# Patient Record
Sex: MALE | Race: BLACK OR AFRICAN AMERICAN | Employment: OTHER | URBAN - METROPOLITAN AREA
[De-identification: names, ages, dates, MRNs, and addresses within clinical notes are randomized per-mention and may not be internally consistent; named-entity substitution may affect disease eponyms.]

---

## 2017-02-13 RX ORDER — IRON POLYSACCHARIDE COMPLEX 180 MG
CAPSULE ORAL
Qty: 90 CAPSULE | Refills: 2 | Status: SHIPPED | OUTPATIENT
Start: 2017-02-13 | End: 2018-02-20 | Stop reason: SDUPTHER

## 2017-02-28 ENCOUNTER — OFFICE VISIT (OUTPATIENT)
Dept: PRIMARY CARE CLINIC | Age: 82
End: 2017-02-28

## 2017-02-28 VITALS
DIASTOLIC BLOOD PRESSURE: 80 MMHG | TEMPERATURE: 97.3 F | OXYGEN SATURATION: 99 % | BODY MASS INDEX: 27.18 KG/M2 | WEIGHT: 206 LBS | SYSTOLIC BLOOD PRESSURE: 130 MMHG | HEART RATE: 91 BPM

## 2017-02-28 DIAGNOSIS — I10 ESSENTIAL HYPERTENSION: ICD-10-CM

## 2017-02-28 DIAGNOSIS — G89.29 CHRONIC PAIN OF LEFT KNEE: ICD-10-CM

## 2017-02-28 DIAGNOSIS — C61 CA OF PROSTATE (HCC): ICD-10-CM

## 2017-02-28 DIAGNOSIS — M54.2 NECK PAIN: Primary | ICD-10-CM

## 2017-02-28 DIAGNOSIS — K21.9 GASTROESOPHAGEAL REFLUX DISEASE WITHOUT ESOPHAGITIS: ICD-10-CM

## 2017-02-28 DIAGNOSIS — E11.8 TYPE 2 DIABETES MELLITUS WITH COMPLICATION, WITHOUT LONG-TERM CURRENT USE OF INSULIN (HCC): ICD-10-CM

## 2017-02-28 DIAGNOSIS — E78.2 MIXED HYPERLIPIDEMIA: ICD-10-CM

## 2017-02-28 DIAGNOSIS — Z23 NEED FOR VACCINATION FOR STREP PNEUMONIAE WITH PCV 7: ICD-10-CM

## 2017-02-28 DIAGNOSIS — M25.562 CHRONIC PAIN OF LEFT KNEE: ICD-10-CM

## 2017-02-28 LAB
A/G RATIO: 1.2 (ref 1.1–2.2)
ALBUMIN SERPL-MCNC: 4.5 G/DL (ref 3.4–5)
ALP BLD-CCNC: 57 U/L (ref 40–129)
ALT SERPL-CCNC: 19 U/L (ref 10–40)
ANION GAP SERPL CALCULATED.3IONS-SCNC: 18 MMOL/L (ref 3–16)
AST SERPL-CCNC: 22 U/L (ref 15–37)
BASOPHILS ABSOLUTE: 0 K/UL (ref 0–0.2)
BASOPHILS RELATIVE PERCENT: 0.9 %
BILIRUB SERPL-MCNC: 0.4 MG/DL (ref 0–1)
BILIRUBIN URINE: NEGATIVE
BLOOD, URINE: NEGATIVE
BUN BLDV-MCNC: 11 MG/DL (ref 7–20)
CALCIUM SERPL-MCNC: 9.9 MG/DL (ref 8.3–10.6)
CHLORIDE BLD-SCNC: 101 MMOL/L (ref 99–110)
CHOLESTEROL, TOTAL: 172 MG/DL (ref 0–199)
CLARITY: CLEAR
CO2: 23 MMOL/L (ref 21–32)
COLOR: ABNORMAL
CREAT SERPL-MCNC: 1 MG/DL (ref 0.8–1.3)
CRYSTALS, UA: ABNORMAL /HPF
EOSINOPHILS ABSOLUTE: 0.3 K/UL (ref 0–0.6)
EOSINOPHILS RELATIVE PERCENT: 7.5 %
EPITHELIAL CELLS, UA: ABNORMAL /HPF
GFR AFRICAN AMERICAN: >60
GFR NON-AFRICAN AMERICAN: >60
GLOBULIN: 3.8 G/DL
GLUCOSE BLD-MCNC: 116 MG/DL (ref 70–99)
GLUCOSE URINE: NEGATIVE MG/DL
HBA1C MFR BLD: 6 %
HCT VFR BLD CALC: 41.8 % (ref 40.5–52.5)
HDLC SERPL-MCNC: 47 MG/DL (ref 40–60)
HEMOGLOBIN: 13.9 G/DL (ref 13.5–17.5)
KETONES, URINE: ABNORMAL MG/DL
LDL CHOLESTEROL CALCULATED: 79 MG/DL
LEUKOCYTE ESTERASE, URINE: NEGATIVE
LYMPHOCYTES ABSOLUTE: 1.4 K/UL (ref 1–5.1)
LYMPHOCYTES RELATIVE PERCENT: 34.3 %
MCH RBC QN AUTO: 31.1 PG (ref 26–34)
MCHC RBC AUTO-ENTMCNC: 33.2 G/DL (ref 31–36)
MCV RBC AUTO: 93.6 FL (ref 80–100)
MICROSCOPIC EXAMINATION: YES
MONOCYTES ABSOLUTE: 0.3 K/UL (ref 0–1.3)
MONOCYTES RELATIVE PERCENT: 8.4 %
NEUTROPHILS ABSOLUTE: 2 K/UL (ref 1.7–7.7)
NEUTROPHILS RELATIVE PERCENT: 48.9 %
NITRITE, URINE: NEGATIVE
PDW BLD-RTO: 14.1 % (ref 12.4–15.4)
PH UA: 6
PLATELET # BLD: 250 K/UL (ref 135–450)
PMV BLD AUTO: 8.3 FL (ref 5–10.5)
POTASSIUM SERPL-SCNC: 3.9 MMOL/L (ref 3.5–5.1)
PROTEIN UA: ABNORMAL MG/DL
RBC # BLD: 4.47 M/UL (ref 4.2–5.9)
RBC UA: ABNORMAL /HPF (ref 0–2)
SODIUM BLD-SCNC: 142 MMOL/L (ref 136–145)
SPECIFIC GRAVITY UA: 1.02
TOTAL PROTEIN: 8.3 G/DL (ref 6.4–8.2)
TRIGL SERPL-MCNC: 231 MG/DL (ref 0–150)
TSH SERPL DL<=0.05 MIU/L-ACNC: 2.86 UIU/ML (ref 0.27–4.2)
URINE TYPE: ABNORMAL
UROBILINOGEN, URINE: 0.2 E.U./DL
VLDLC SERPL CALC-MCNC: 46 MG/DL
WBC # BLD: 4.1 K/UL (ref 4–11)
WBC UA: ABNORMAL /HPF (ref 0–5)

## 2017-02-28 PROCEDURE — G0009 ADMIN PNEUMOCOCCAL VACCINE: HCPCS | Performed by: INTERNAL MEDICINE

## 2017-02-28 PROCEDURE — 90670 PCV13 VACCINE IM: CPT | Performed by: INTERNAL MEDICINE

## 2017-02-28 PROCEDURE — 83036 HEMOGLOBIN GLYCOSYLATED A1C: CPT | Performed by: INTERNAL MEDICINE

## 2017-02-28 PROCEDURE — 99214 OFFICE O/P EST MOD 30 MIN: CPT | Performed by: INTERNAL MEDICINE

## 2017-02-28 RX ORDER — PRAVASTATIN SODIUM 20 MG
20 TABLET ORAL DAILY
Qty: 30 TABLET | Refills: 6 | Status: SHIPPED | OUTPATIENT
Start: 2017-02-28 | End: 2017-11-30 | Stop reason: SDUPTHER

## 2017-02-28 RX ORDER — ACETAMINOPHEN 500 MG
500 TABLET ORAL EVERY 6 HOURS PRN
Qty: 50 TABLET | Refills: 3 | Status: SHIPPED | OUTPATIENT
Start: 2017-02-28 | End: 2018-01-23 | Stop reason: SDUPTHER

## 2017-02-28 RX ORDER — OMEPRAZOLE 40 MG/1
40 CAPSULE, DELAYED RELEASE ORAL DAILY
Qty: 30 CAPSULE | Refills: 5 | Status: SHIPPED | OUTPATIENT
Start: 2017-02-28 | End: 2017-08-21 | Stop reason: SDUPTHER

## 2017-02-28 RX ORDER — ASPIRIN 81 MG/1
81 TABLET ORAL DAILY
Qty: 90 TABLET | Refills: 11 | Status: SHIPPED | OUTPATIENT
Start: 2017-02-28 | End: 2018-01-23 | Stop reason: SDUPTHER

## 2017-02-28 RX ORDER — NIFEDIPINE 60 MG/1
60 TABLET, EXTENDED RELEASE ORAL DAILY
Qty: 30 TABLET | Refills: 6 | Status: SHIPPED | OUTPATIENT
Start: 2017-02-28 | End: 2017-11-30 | Stop reason: SDUPTHER

## 2017-02-28 ASSESSMENT — ENCOUNTER SYMPTOMS
SHORTNESS OF BREATH: 0
BLURRED VISION: 0
COUGH: 0
CHEST TIGHTNESS: 0
GASTROINTESTINAL NEGATIVE: 1
WHEEZING: 0
EYES NEGATIVE: 1
CONSTIPATION: 0
BLOOD IN STOOL: 0
DIARRHEA: 0

## 2017-02-28 ASSESSMENT — PATIENT HEALTH QUESTIONNAIRE - PHQ9
2. FEELING DOWN, DEPRESSED OR HOPELESS: 0
1. LITTLE INTEREST OR PLEASURE IN DOING THINGS: 0
SUM OF ALL RESPONSES TO PHQ9 QUESTIONS 1 & 2: 0
SUM OF ALL RESPONSES TO PHQ QUESTIONS 1-9: 0

## 2017-03-02 LAB
PROSTATE SPECIFIC ANTIGEN FREE: <0.1 NG/ML
PROSTATE SPECIFIC ANTIGEN PERCENT FREE: <100 %
PROSTATE SPECIFIC ANTIGEN: 0.1 NG/ML (ref 0–4)

## 2017-03-17 ENCOUNTER — TELEPHONE (OUTPATIENT)
Dept: PRIMARY CARE CLINIC | Age: 82
End: 2017-03-17

## 2017-04-04 ENCOUNTER — OFFICE VISIT (OUTPATIENT)
Dept: PRIMARY CARE CLINIC | Age: 82
End: 2017-04-04

## 2017-04-04 VITALS
WEIGHT: 208 LBS | HEART RATE: 85 BPM | BODY MASS INDEX: 27.44 KG/M2 | SYSTOLIC BLOOD PRESSURE: 140 MMHG | DIASTOLIC BLOOD PRESSURE: 85 MMHG | OXYGEN SATURATION: 98 %

## 2017-04-04 DIAGNOSIS — I10 ESSENTIAL HYPERTENSION: ICD-10-CM

## 2017-04-04 DIAGNOSIS — M17.12 PRIMARY OSTEOARTHRITIS OF LEFT KNEE: Primary | ICD-10-CM

## 2017-04-04 PROCEDURE — 99213 OFFICE O/P EST LOW 20 MIN: CPT | Performed by: INTERNAL MEDICINE

## 2017-04-04 ASSESSMENT — ENCOUNTER SYMPTOMS
EYES NEGATIVE: 1
SHORTNESS OF BREATH: 0
WHEEZING: 0
DIARRHEA: 0
BLURRED VISION: 0
GASTROINTESTINAL NEGATIVE: 1
CHEST TIGHTNESS: 0
CONSTIPATION: 0
BLOOD IN STOOL: 0
COUGH: 0

## 2017-04-18 ENCOUNTER — TELEPHONE (OUTPATIENT)
Dept: PRIMARY CARE CLINIC | Age: 82
End: 2017-04-18

## 2017-04-27 ENCOUNTER — TELEPHONE (OUTPATIENT)
Dept: PRIMARY CARE CLINIC | Age: 82
End: 2017-04-27

## 2017-04-27 DIAGNOSIS — M25.562 LEFT KNEE PAIN, UNSPECIFIED CHRONICITY: Primary | ICD-10-CM

## 2017-05-03 ENCOUNTER — OFFICE VISIT (OUTPATIENT)
Dept: ORTHOPEDIC SURGERY | Age: 82
End: 2017-05-03

## 2017-05-03 VITALS
TEMPERATURE: 97.8 F | WEIGHT: 206 LBS | DIASTOLIC BLOOD PRESSURE: 74 MMHG | HEART RATE: 83 BPM | HEIGHT: 73 IN | BODY MASS INDEX: 27.3 KG/M2 | SYSTOLIC BLOOD PRESSURE: 119 MMHG

## 2017-05-03 DIAGNOSIS — M17.0 PRIMARY OSTEOARTHRITIS OF BOTH KNEES: Primary | ICD-10-CM

## 2017-05-03 PROCEDURE — 99203 OFFICE O/P NEW LOW 30 MIN: CPT | Performed by: ORTHOPAEDIC SURGERY

## 2017-05-03 PROCEDURE — L1812 KO ELASTIC W/JOINTS PRE OTS: HCPCS | Performed by: ORTHOPAEDIC SURGERY

## 2017-09-28 ENCOUNTER — OFFICE VISIT (OUTPATIENT)
Dept: PRIMARY CARE CLINIC | Age: 82
End: 2017-09-28

## 2017-09-28 VITALS
TEMPERATURE: 97.7 F | WEIGHT: 205 LBS | SYSTOLIC BLOOD PRESSURE: 140 MMHG | RESPIRATION RATE: 16 BRPM | OXYGEN SATURATION: 100 % | HEIGHT: 73 IN | HEART RATE: 80 BPM | DIASTOLIC BLOOD PRESSURE: 78 MMHG | BODY MASS INDEX: 27.17 KG/M2

## 2017-09-28 DIAGNOSIS — Z23 NEED FOR TDAP VACCINATION: ICD-10-CM

## 2017-09-28 DIAGNOSIS — Z23 FLU VACCINE NEED: ICD-10-CM

## 2017-09-28 DIAGNOSIS — I10 ESSENTIAL HYPERTENSION: ICD-10-CM

## 2017-09-28 DIAGNOSIS — L98.9 FACE LESION: ICD-10-CM

## 2017-09-28 DIAGNOSIS — E11.8 TYPE 2 DIABETES MELLITUS WITH COMPLICATION, WITHOUT LONG-TERM CURRENT USE OF INSULIN (HCC): ICD-10-CM

## 2017-09-28 DIAGNOSIS — J40 BRONCHITIS: Primary | ICD-10-CM

## 2017-09-28 LAB — HBA1C MFR BLD: 6 %

## 2017-09-28 PROCEDURE — 83036 HEMOGLOBIN GLYCOSYLATED A1C: CPT | Performed by: INTERNAL MEDICINE

## 2017-09-28 PROCEDURE — 90662 IIV NO PRSV INCREASED AG IM: CPT | Performed by: INTERNAL MEDICINE

## 2017-09-28 PROCEDURE — G0008 ADMIN INFLUENZA VIRUS VAC: HCPCS | Performed by: INTERNAL MEDICINE

## 2017-09-28 PROCEDURE — 99214 OFFICE O/P EST MOD 30 MIN: CPT | Performed by: INTERNAL MEDICINE

## 2017-09-28 PROCEDURE — 90471 IMMUNIZATION ADMIN: CPT | Performed by: INTERNAL MEDICINE

## 2017-09-28 PROCEDURE — 90715 TDAP VACCINE 7 YRS/> IM: CPT | Performed by: INTERNAL MEDICINE

## 2017-09-28 RX ORDER — GUAIFENESIN 600 MG/1
600 TABLET, EXTENDED RELEASE ORAL 2 TIMES DAILY
Qty: 12 TABLET | Refills: 0 | Status: SHIPPED | OUTPATIENT
Start: 2017-09-28 | End: 2018-01-23

## 2017-09-28 RX ORDER — AZITHROMYCIN 250 MG/1
TABLET, FILM COATED ORAL
Qty: 1 PACKET | Refills: 0 | Status: SHIPPED | OUTPATIENT
Start: 2017-09-28 | End: 2017-10-08

## 2017-09-28 ASSESSMENT — ENCOUNTER SYMPTOMS
COUGH: 1
WHEEZING: 0
COLOR CHANGE: 1
SHORTNESS OF BREATH: 0
EYES NEGATIVE: 1
CHEST TIGHTNESS: 0
GASTROINTESTINAL NEGATIVE: 1
CONSTIPATION: 0
DIARRHEA: 0
BLOOD IN STOOL: 0

## 2017-11-13 ENCOUNTER — OFFICE VISIT (OUTPATIENT)
Dept: PRIMARY CARE CLINIC | Age: 82
End: 2017-11-13

## 2017-11-13 ENCOUNTER — OFFICE VISIT (OUTPATIENT)
Dept: SURGERY | Age: 82
End: 2017-11-13

## 2017-11-13 VITALS
HEIGHT: 73 IN | OXYGEN SATURATION: 98 % | DIASTOLIC BLOOD PRESSURE: 80 MMHG | HEART RATE: 84 BPM | SYSTOLIC BLOOD PRESSURE: 140 MMHG | WEIGHT: 201 LBS | TEMPERATURE: 98.9 F | BODY MASS INDEX: 26.64 KG/M2

## 2017-11-13 VITALS
WEIGHT: 206 LBS | BODY MASS INDEX: 27.3 KG/M2 | HEIGHT: 73 IN | DIASTOLIC BLOOD PRESSURE: 73 MMHG | HEART RATE: 87 BPM | SYSTOLIC BLOOD PRESSURE: 142 MMHG

## 2017-11-13 DIAGNOSIS — L85.8 CUTANEOUS HORN: ICD-10-CM

## 2017-11-13 DIAGNOSIS — L98.9 SKIN LESION OF FACE: Primary | ICD-10-CM

## 2017-11-13 DIAGNOSIS — J20.9 BRONCHITIS, ACUTE, WITH BRONCHOSPASM: Primary | ICD-10-CM

## 2017-11-13 PROCEDURE — 99213 OFFICE O/P EST LOW 20 MIN: CPT | Performed by: INTERNAL MEDICINE

## 2017-11-13 PROCEDURE — G8484 FLU IMMUNIZE NO ADMIN: HCPCS | Performed by: INTERNAL MEDICINE

## 2017-11-13 PROCEDURE — G8427 DOCREV CUR MEDS BY ELIG CLIN: HCPCS | Performed by: INTERNAL MEDICINE

## 2017-11-13 PROCEDURE — 4040F PNEUMOC VAC/ADMIN/RCVD: CPT | Performed by: INTERNAL MEDICINE

## 2017-11-13 PROCEDURE — G8419 CALC BMI OUT NRM PARAM NOF/U: HCPCS | Performed by: INTERNAL MEDICINE

## 2017-11-13 PROCEDURE — 1036F TOBACCO NON-USER: CPT | Performed by: INTERNAL MEDICINE

## 2017-11-13 PROCEDURE — 1123F ACP DISCUSS/DSCN MKR DOCD: CPT | Performed by: INTERNAL MEDICINE

## 2017-11-13 PROCEDURE — 11311 SHAVE SKIN LESION 0.6-1.0 CM: CPT | Performed by: SURGERY

## 2017-11-13 RX ORDER — AZITHROMYCIN 250 MG/1
250 TABLET, FILM COATED ORAL DAILY
Qty: 14 TABLET | Refills: 0 | Status: SHIPPED | OUTPATIENT
Start: 2017-11-13 | End: 2018-01-23

## 2017-11-13 RX ORDER — BUDESONIDE AND FORMOTEROL FUMARATE DIHYDRATE 160; 4.5 UG/1; UG/1
1 AEROSOL RESPIRATORY (INHALATION) 2 TIMES DAILY
Qty: 1 INHALER | Refills: 3 | Status: SHIPPED | OUTPATIENT
Start: 2017-11-13 | End: 2018-01-23

## 2017-11-13 RX ORDER — MOMETASONE FUROATE 50 UG/1
2 SPRAY, METERED NASAL DAILY
Qty: 1 INHALER | Refills: 0 | Status: SHIPPED | OUTPATIENT
Start: 2017-11-13 | End: 2018-01-23

## 2017-11-13 ASSESSMENT — ENCOUNTER SYMPTOMS
DIARRHEA: 0
RHINORRHEA: 1
EYES NEGATIVE: 1
WHEEZING: 0
CHEST TIGHTNESS: 0
COLOR CHANGE: 1
GASTROINTESTINAL NEGATIVE: 1
BLOOD IN STOOL: 0
COUGH: 1
SHORTNESS OF BREATH: 0
CONSTIPATION: 0

## 2017-11-13 NOTE — PATIENT INSTRUCTIONS
Face lesion removed. Will send specimen off to pathology. We will call with results. Apply Neosporin to wound twice daily. Follow up as needed.

## 2017-11-13 NOTE — PROGRESS NOTES
New Patient [facility]   Estella Schultz MD    [address], [address]  [place]  Phone: [phone]  Fax: [phone]   YOB: 1932    Date of Visit:  11/13/2017    No ref. provider found  Prema Underwood MD    HPI:   Sebaceous cyst: Denia Rod presents for evaluation of a skin lesion as a referral from Prema Underwood MD.  Lesion is located in the left preauricular area. Onset was 4 months ago. He denies any pain, but the lesion is growing longer and bothers him with wearing glasses. Patient does not have any family or personal history of skin cancer. Patient does have diabetes. He denies any fever and chills. Pain Score:   0 - No pain    No Known Allergies  Outpatient Prescriptions Marked as Taking for the 11/13/17 encounter (Office Visit) with Patrick Robles MD   Medication Sig Dispense Refill    guaiFENesin (MUCINEX) 600 MG extended release tablet Take 1 tablet by mouth 2 times daily 12 tablet 0    HYDROcodone-homatropine (HYCODAN) 5-1.5 MG/5ML syrup Take 2.5 mLs by mouth every 6 hours as needed (cough) .  4 mL 0    omeprazole (PRILOSEC) 40 MG delayed release capsule TAKE ONE CAPSULE BY MOUTH DAILY 90 capsule 0    metFORMIN (GLUCOPHAGE) 500 MG tablet Take 1 tablet by mouth daily (with breakfast) 30 tablet 6    pravastatin (PRAVACHOL) 20 MG tablet Take 1 tablet by mouth daily 30 tablet 6    aspirin 81 MG EC tablet Take 1 tablet by mouth daily 90 tablet 11    NIFEdipine (NIFEDICAL XL) 60 MG extended release tablet Take 1 tablet by mouth daily 30 tablet 6    acetaminophen (APAP EXTRA STRENGTH) 500 MG tablet Take 1 tablet by mouth every 6 hours as needed for Pain 50 tablet 3    PROFE 391.3 (180 FE) MG CAPS TAKE ONE CAPSULE BY MOUTH DAILY 90 capsule 2       Past Medical History:   Diagnosis Date    Hyperlipidemia     Hypertension     Type II or unspecified type diabetes mellitus without mention of complication, not stated as uncontrolled      Past Surgical History:   Procedure Laterality Date    COLONOSCOPY       History reviewed. No pertinent family history. Social History     Social History    Marital status:      Spouse name: N/A    Number of children: N/A    Years of education: N/A     Occupational History    Not on file. Social History Main Topics    Smoking status: Never Smoker    Smokeless tobacco: Never Used    Alcohol use Yes      Comment: occas    Drug use: No    Sexual activity: Not on file     Other Topics Concern    Not on file     Social History Narrative    No narrative on file          Vitals:    11/13/17 0915   BP: (!) 142/73   Pulse: 87   Weight: 206 lb (93.4 kg)   Height: 6' 1\" (1.854 m)     Body mass index is 27.18 kg/m². Wt Readings from Last 3 Encounters:   11/13/17 201 lb (91.2 kg)   11/13/17 206 lb (93.4 kg)   09/28/17 205 lb (93 kg)     BP Readings from Last 3 Encounters:   11/13/17 (!) 140/80   11/13/17 (!) 142/73   09/28/17 (!) 140/78          REVIEW OF SYSTEMS:   Pertinent positives are in HPI, otherwise all systems reviewed and negative    PHYSICAL EXAM:    CONSTITUTIONAL:  awake, alert, no apparent distress and normal weight  ENT:  normocephalic, without obvious abnormality  NECK:  supple, symmetrical, trachea midline   LUNGS:  Resp easy and unlabored  CARDIOVASCULAR:  regular rate and rhythm  MUSCULOSKELETAL: No edema  NEUROLOGIC:  Mental Status Exam:  Level of Alertness:   awake  Orientation:   person, place, time  SKIN: left temple with 6 mm diameter 1 cm long growth extending from skin with pale irregular surface, no surrounding erythema or drainage, no cervical, pre or post auricular lymphadenopathy        ASSESSMENT:    1. Skin lesion of face  SURGICAL PATHOLOGY    19059 - OR SHAV SKIN LES 6-10MM FACE,FACIAL   2.  Cutaneous horn  SURGICAL PATHOLOGY    59174 - OR SHAV SKIN LES 6-10MM FACE,FACIAL PLAN:    After evaluating the patient, I believe that the patient has a left pre-auricular cutaneous horn. Although he is [place], I am concerned that it may be associated with a squamous cell carcinoma. I performed a shave excision of the lesion. After verbal consent was obtained, the left temple was prepped and draped. 2% lidocaine with epinephrine was injected around the 6 mm round lesion. A 15 blade scalpel was used to shave the lesion off at its base. Pressure and silver nitrate sticks were used for hemostasis. The lesion was able to be removed in its entirety. Polysporin ointment was placed over the wound. The pt tolerated the procedure well. Will call patient with the pathology results, and follow up as indicated.     Electronically signed by Navi Goodwin MD

## 2017-11-29 ENCOUNTER — TELEPHONE (OUTPATIENT)
Dept: PRIMARY CARE CLINIC | Age: 82
End: 2017-11-29

## 2017-11-30 DIAGNOSIS — E11.8 TYPE 2 DIABETES MELLITUS WITH COMPLICATION, WITHOUT LONG-TERM CURRENT USE OF INSULIN (HCC): ICD-10-CM

## 2017-11-30 DIAGNOSIS — I10 ESSENTIAL HYPERTENSION: ICD-10-CM

## 2017-11-30 DIAGNOSIS — E78.2 MIXED HYPERLIPIDEMIA: ICD-10-CM

## 2017-11-30 DIAGNOSIS — K21.9 GASTROESOPHAGEAL REFLUX DISEASE WITHOUT ESOPHAGITIS: ICD-10-CM

## 2017-11-30 RX ORDER — OMEPRAZOLE 40 MG/1
40 CAPSULE, DELAYED RELEASE ORAL DAILY PRN
Qty: 30 CAPSULE | Refills: 5 | Status: SHIPPED | OUTPATIENT
Start: 2017-11-30 | End: 2018-05-22 | Stop reason: SDUPTHER

## 2017-11-30 RX ORDER — NIFEDIPINE 60 MG/1
60 TABLET, EXTENDED RELEASE ORAL DAILY
Qty: 30 TABLET | Refills: 6 | Status: SHIPPED | OUTPATIENT
Start: 2017-11-30 | End: 2018-01-23 | Stop reason: SDUPTHER

## 2017-11-30 RX ORDER — PRAVASTATIN SODIUM 20 MG
20 TABLET ORAL DAILY
Qty: 30 TABLET | Refills: 6 | Status: SHIPPED | OUTPATIENT
Start: 2017-11-30 | End: 2018-01-23 | Stop reason: SDUPTHER

## 2018-01-23 ENCOUNTER — OFFICE VISIT (OUTPATIENT)
Dept: PRIMARY CARE CLINIC | Age: 83
End: 2018-01-23

## 2018-01-23 VITALS
TEMPERATURE: 97.3 F | WEIGHT: 202 LBS | DIASTOLIC BLOOD PRESSURE: 70 MMHG | HEART RATE: 77 BPM | SYSTOLIC BLOOD PRESSURE: 120 MMHG | HEIGHT: 73 IN | OXYGEN SATURATION: 97 % | BODY MASS INDEX: 26.77 KG/M2

## 2018-01-23 DIAGNOSIS — E78.2 MIXED HYPERLIPIDEMIA: ICD-10-CM

## 2018-01-23 DIAGNOSIS — M15.9 GENERALIZED OA: ICD-10-CM

## 2018-01-23 DIAGNOSIS — E11.9 TYPE 2 DIABETES MELLITUS WITHOUT COMPLICATION, WITHOUT LONG-TERM CURRENT USE OF INSULIN (HCC): Primary | ICD-10-CM

## 2018-01-23 DIAGNOSIS — H61.23 BILATERAL IMPACTED CERUMEN: ICD-10-CM

## 2018-01-23 DIAGNOSIS — I10 ESSENTIAL HYPERTENSION: ICD-10-CM

## 2018-01-23 LAB — HBA1C MFR BLD: 5.7 %

## 2018-01-23 PROCEDURE — 99214 OFFICE O/P EST MOD 30 MIN: CPT | Performed by: INTERNAL MEDICINE

## 2018-01-23 PROCEDURE — G8427 DOCREV CUR MEDS BY ELIG CLIN: HCPCS | Performed by: INTERNAL MEDICINE

## 2018-01-23 PROCEDURE — 83036 HEMOGLOBIN GLYCOSYLATED A1C: CPT | Performed by: INTERNAL MEDICINE

## 2018-01-23 PROCEDURE — 1036F TOBACCO NON-USER: CPT | Performed by: INTERNAL MEDICINE

## 2018-01-23 PROCEDURE — 4040F PNEUMOC VAC/ADMIN/RCVD: CPT | Performed by: INTERNAL MEDICINE

## 2018-01-23 PROCEDURE — G8419 CALC BMI OUT NRM PARAM NOF/U: HCPCS | Performed by: INTERNAL MEDICINE

## 2018-01-23 PROCEDURE — 1123F ACP DISCUSS/DSCN MKR DOCD: CPT | Performed by: INTERNAL MEDICINE

## 2018-01-23 PROCEDURE — G8484 FLU IMMUNIZE NO ADMIN: HCPCS | Performed by: INTERNAL MEDICINE

## 2018-01-23 RX ORDER — PRAVASTATIN SODIUM 20 MG
20 TABLET ORAL DAILY
Qty: 30 TABLET | Refills: 6 | Status: SHIPPED | OUTPATIENT
Start: 2018-01-23 | End: 2018-05-22 | Stop reason: SDUPTHER

## 2018-01-23 RX ORDER — ASPIRIN 81 MG/1
81 TABLET ORAL DAILY
Qty: 90 TABLET | Refills: 11 | Status: SHIPPED | OUTPATIENT
Start: 2018-01-23 | End: 2018-05-22 | Stop reason: SDUPTHER

## 2018-01-23 RX ORDER — ACETAMINOPHEN 500 MG
500 TABLET ORAL EVERY 6 HOURS PRN
Qty: 50 TABLET | Refills: 3 | Status: SHIPPED | OUTPATIENT
Start: 2018-01-23 | End: 2019-06-04

## 2018-01-23 RX ORDER — NIFEDIPINE 60 MG/1
60 TABLET, EXTENDED RELEASE ORAL DAILY
Qty: 30 TABLET | Refills: 6 | Status: SHIPPED | OUTPATIENT
Start: 2018-01-23 | End: 2018-05-22 | Stop reason: SDUPTHER

## 2018-01-23 ASSESSMENT — ENCOUNTER SYMPTOMS
COLOR CHANGE: 1
GASTROINTESTINAL NEGATIVE: 1
WHEEZING: 0
SHORTNESS OF BREATH: 0
CHEST TIGHTNESS: 0
CONSTIPATION: 0
EYES NEGATIVE: 1
BLOOD IN STOOL: 0
DIARRHEA: 0

## 2018-01-23 NOTE — PROGRESS NOTES
Subjective:      Patient ID: Ashley Michelle is a 80 y.o. male. 1/23/18  Patient presents with:  Diabetes  Hypertension  Hearing Problem    Symbicort helped tremendously! 11/13/17              Diabetes   Pertinent negatives for hypoglycemia include no dizziness, headaches, nervousness/anxiousness or tremors. Pertinent negatives for diabetes include no chest pain, no fatigue and no weakness. Review of Systems   Constitutional: Negative for chills, fatigue and fever. Pneumovac 2007. PREVNAR 2/17    Tdap 9/17    Flu vac  9/17    Zostavac / script    HENT: Positive for hearing loss. Dentures . Eyes: Negative. Eye Ex  9/16    Cataract surgery planned 4/17   Respiratory: Negative for chest tightness, shortness of breath and wheezing. Does not smoke    Cardiovascular: Negative. Negative for chest pain. HTN. No CAD   Gastrointestinal: Negative. Negative for blood in stool, constipation and diarrhea. Repeat Colonoscopy 10/15 . Dr Chetan Ceron . Polyps   Endocrine:        Diabetes > 10 yrs    Simple cysts in Thyroid US / 3/15   Genitourinary: Negative for dysuria and urgency. H/O Prostate Cancer ( 2004)  Saw Dr Fouzia Rothman  . 2/16    Musculoskeletal: Positive for arthralgias. Sees Podiatrist regularly  q 6 mths    Skin: Positive for color change. Negative for rash. Allergic/Immunologic: Negative for food allergies. Neurological: Negative for dizziness, tremors, weakness, light-headedness and headaches. Psychiatric/Behavioral: Negative for behavioral problems and sleep disturbance. The patient is not nervous/anxious. Objective:   Physical Exam   Constitutional: He is oriented to person, place, and time. No distress. HENT:   Ear wax bilat   Eyes: Conjunctivae are normal.   Cardiovascular: Normal rate, regular rhythm and normal heart sounds. Pulmonary/Chest: Breath sounds normal. He has no wheezes. He has no rales.        Abdominal: He exhibits distension. There is no tenderness. Musculoskeletal: Normal range of motion. Neurological: He is alert and oriented to person, place, and time. He has normal strength. Skin: Skin is warm. Psychiatric: He has a normal mood and affect. His behavior is normal.   Vitals reviewed. Assessment:      Mari Yepez was seen today for diabetes, hypertension and hearing problem. Diagnoses and all orders for this visit:    Type 2 diabetes mellitus without complication, without long-term current use of insulin ([facility]) A1C 5.7  Cont same   -     POCT glycosylated hemoglobin (Hb A1C)  -     metFORMIN (GLUCOPHAGE) 500 MG tablet; Take 1 tablet by mouth daily (with breakfast)  -     pravastatin (PRAVACHOL) 20 MG tablet; Take 1 tablet by mouth daily  -     aspirin 81 MG EC tablet; Take 1 tablet by mouth daily    Essential hypertension  Controlled   -     NIFEdipine (NIFEDICAL XL) 60 MG extended release tablet; Take 1 tablet by mouth daily    Mixed hyperlipidemia  Controlled   -     pravastatin (PRAVACHOL) 20 MG tablet; Take 1 tablet by mouth daily        Generalized OA  -     acetaminophen (APAP EXTRA STRENGTH) 500 MG tablet;  Take 1 tablet by mouth every 6 hours as needed for Pain        Bilateral impacted cerumen  -     OR REMOVAL IMPACTED CERUMEN IRRIGATION/LVG UNILAT                CA of prostate ([address]                  -     -    Self Management Goals    Know which medication is for what condition:   Know side effects of medications, and discuss with doctor   Discuss side effects and instructions on new medications  Know correct dose/frequency of medications  Take medications at the same time each day  Stay current on medication refills  If taking OTC's check with MD/pharmacy first about interactions  Monitor sugars and record time/meal  A1C goal 7.0 or less  LDL goal 805 or less  Systolic BP < or equal to 834  Diastolic BP < or equal to 85    Current Flu and Pneumonia Vax  Set targets for weight loss 4 lbs per

## 2018-03-12 RX ORDER — IRON POLYSACCHARIDE COMPLEX 180 MG
CAPSULE ORAL
Qty: 30 CAPSULE | Refills: 0 | Status: SHIPPED | OUTPATIENT
Start: 2018-03-12 | End: 2018-04-10 | Stop reason: SDUPTHER

## 2018-04-11 RX ORDER — IRON POLYSACCHARIDE COMPLEX 180 MG
CAPSULE ORAL
Qty: 30 CAPSULE | Refills: 0 | Status: SHIPPED | OUTPATIENT
Start: 2018-04-11 | End: 2018-05-19 | Stop reason: SDUPTHER

## 2018-05-21 RX ORDER — IRON POLYSACCHARIDE COMPLEX 180 MG
CAPSULE ORAL
Qty: 30 CAPSULE | Refills: 0 | Status: SHIPPED | OUTPATIENT
Start: 2018-05-21 | End: 2018-06-20 | Stop reason: SDUPTHER

## 2018-05-22 ENCOUNTER — OFFICE VISIT (OUTPATIENT)
Dept: PRIMARY CARE CLINIC | Age: 83
End: 2018-05-22

## 2018-05-22 VITALS
OXYGEN SATURATION: 99 % | BODY MASS INDEX: 25.98 KG/M2 | SYSTOLIC BLOOD PRESSURE: 130 MMHG | TEMPERATURE: 98.8 F | HEIGHT: 73 IN | DIASTOLIC BLOOD PRESSURE: 70 MMHG | WEIGHT: 196 LBS | HEART RATE: 86 BPM

## 2018-05-22 DIAGNOSIS — E78.2 MIXED HYPERLIPIDEMIA: ICD-10-CM

## 2018-05-22 DIAGNOSIS — E11.9 TYPE 2 DIABETES MELLITUS WITHOUT COMPLICATION, WITHOUT LONG-TERM CURRENT USE OF INSULIN (HCC): ICD-10-CM

## 2018-05-22 DIAGNOSIS — E11.9 TYPE 2 DIABETES MELLITUS WITHOUT COMPLICATION, WITHOUT LONG-TERM CURRENT USE OF INSULIN (HCC): Primary | ICD-10-CM

## 2018-05-22 DIAGNOSIS — Z23 NEED FOR SHINGLES VACCINE: ICD-10-CM

## 2018-05-22 DIAGNOSIS — K21.9 GASTROESOPHAGEAL REFLUX DISEASE WITHOUT ESOPHAGITIS: ICD-10-CM

## 2018-05-22 DIAGNOSIS — I10 ESSENTIAL HYPERTENSION: ICD-10-CM

## 2018-05-22 LAB
A/G RATIO: 1.4 (ref 1.1–2.2)
ALBUMIN SERPL-MCNC: 4.5 G/DL (ref 3.4–5)
ALP BLD-CCNC: 54 U/L (ref 40–129)
ALT SERPL-CCNC: 15 U/L (ref 10–40)
ANION GAP SERPL CALCULATED.3IONS-SCNC: 19 MMOL/L (ref 3–16)
AST SERPL-CCNC: 19 U/L (ref 15–37)
BASOPHILS ABSOLUTE: 0 K/UL (ref 0–0.2)
BASOPHILS RELATIVE PERCENT: 0.9 %
BILIRUB SERPL-MCNC: 0.4 MG/DL (ref 0–1)
BUN BLDV-MCNC: 11 MG/DL (ref 7–20)
CALCIUM SERPL-MCNC: 9.7 MG/DL (ref 8.3–10.6)
CHLORIDE BLD-SCNC: 102 MMOL/L (ref 99–110)
CHOLESTEROL, TOTAL: 146 MG/DL (ref 0–199)
CO2: 22 MMOL/L (ref 21–32)
CREAT SERPL-MCNC: 1 MG/DL (ref 0.8–1.3)
EOSINOPHILS ABSOLUTE: 0.5 K/UL (ref 0–0.6)
EOSINOPHILS RELATIVE PERCENT: 9.9 %
GFR AFRICAN AMERICAN: >60
GFR NON-AFRICAN AMERICAN: >60
GLOBULIN: 3.2 G/DL
GLUCOSE BLD-MCNC: 111 MG/DL (ref 70–99)
HBA1C MFR BLD: 5.7 %
HCT VFR BLD CALC: 36.7 % (ref 40.5–52.5)
HDLC SERPL-MCNC: 40 MG/DL (ref 40–60)
HEMOGLOBIN: 13 G/DL (ref 13.5–17.5)
LDL CHOLESTEROL CALCULATED: 60 MG/DL
LYMPHOCYTES ABSOLUTE: 1.5 K/UL (ref 1–5.1)
LYMPHOCYTES RELATIVE PERCENT: 32.1 %
MCH RBC QN AUTO: 32.7 PG (ref 26–34)
MCHC RBC AUTO-ENTMCNC: 35.4 G/DL (ref 31–36)
MCV RBC AUTO: 92.4 FL (ref 80–100)
MONOCYTES ABSOLUTE: 0.4 K/UL (ref 0–1.3)
MONOCYTES RELATIVE PERCENT: 9.1 %
NEUTROPHILS ABSOLUTE: 2.2 K/UL (ref 1.7–7.7)
NEUTROPHILS RELATIVE PERCENT: 48 %
PDW BLD-RTO: 13.8 % (ref 12.4–15.4)
PLATELET # BLD: 275 K/UL (ref 135–450)
PMV BLD AUTO: 8.4 FL (ref 5–10.5)
POTASSIUM SERPL-SCNC: 4 MMOL/L (ref 3.5–5.1)
RBC # BLD: 3.97 M/UL (ref 4.2–5.9)
SODIUM BLD-SCNC: 143 MMOL/L (ref 136–145)
TOTAL PROTEIN: 7.7 G/DL (ref 6.4–8.2)
TRIGL SERPL-MCNC: 228 MG/DL (ref 0–150)
TSH SERPL DL<=0.05 MIU/L-ACNC: 1.98 UIU/ML (ref 0.27–4.2)
VLDLC SERPL CALC-MCNC: 46 MG/DL
WBC # BLD: 4.6 K/UL (ref 4–11)

## 2018-05-22 PROCEDURE — 4040F PNEUMOC VAC/ADMIN/RCVD: CPT | Performed by: INTERNAL MEDICINE

## 2018-05-22 PROCEDURE — 99214 OFFICE O/P EST MOD 30 MIN: CPT | Performed by: INTERNAL MEDICINE

## 2018-05-22 PROCEDURE — 1036F TOBACCO NON-USER: CPT | Performed by: INTERNAL MEDICINE

## 2018-05-22 PROCEDURE — G8419 CALC BMI OUT NRM PARAM NOF/U: HCPCS | Performed by: INTERNAL MEDICINE

## 2018-05-22 PROCEDURE — 1123F ACP DISCUSS/DSCN MKR DOCD: CPT | Performed by: INTERNAL MEDICINE

## 2018-05-22 PROCEDURE — G8427 DOCREV CUR MEDS BY ELIG CLIN: HCPCS | Performed by: INTERNAL MEDICINE

## 2018-05-22 PROCEDURE — 83036 HEMOGLOBIN GLYCOSYLATED A1C: CPT | Performed by: INTERNAL MEDICINE

## 2018-05-22 RX ORDER — NIFEDIPINE 60 MG/1
60 TABLET, EXTENDED RELEASE ORAL DAILY
Qty: 30 TABLET | Refills: 6 | Status: SHIPPED | OUTPATIENT
Start: 2018-05-22 | End: 2018-09-04 | Stop reason: SDUPTHER

## 2018-05-22 RX ORDER — OMEPRAZOLE 40 MG/1
40 CAPSULE, DELAYED RELEASE ORAL DAILY PRN
Qty: 30 CAPSULE | Refills: 5 | Status: SHIPPED | OUTPATIENT
Start: 2018-05-22 | End: 2018-09-04 | Stop reason: SDUPTHER

## 2018-05-22 RX ORDER — PRAVASTATIN SODIUM 20 MG
20 TABLET ORAL DAILY
Qty: 30 TABLET | Refills: 6 | Status: SHIPPED | OUTPATIENT
Start: 2018-05-22 | End: 2018-09-04 | Stop reason: SDUPTHER

## 2018-05-22 RX ORDER — ASPIRIN 81 MG/1
81 TABLET ORAL DAILY
Qty: 90 TABLET | Refills: 11 | Status: SHIPPED | OUTPATIENT
Start: 2018-05-22 | End: 2018-09-04 | Stop reason: SDUPTHER

## 2018-05-22 ASSESSMENT — ENCOUNTER SYMPTOMS
CHEST TIGHTNESS: 0
CONSTIPATION: 0
BLOOD IN STOOL: 0
GASTROINTESTINAL NEGATIVE: 1
DIARRHEA: 0
EYES NEGATIVE: 1
SHORTNESS OF BREATH: 0
WHEEZING: 0

## 2018-05-22 ASSESSMENT — PATIENT HEALTH QUESTIONNAIRE - PHQ9
2. FEELING DOWN, DEPRESSED OR HOPELESS: 0
SUM OF ALL RESPONSES TO PHQ QUESTIONS 1-9: 0
SUM OF ALL RESPONSES TO PHQ9 QUESTIONS 1 & 2: 0
1. LITTLE INTEREST OR PLEASURE IN DOING THINGS: 0

## 2018-05-24 DIAGNOSIS — E11.9 TYPE 2 DIABETES MELLITUS WITHOUT COMPLICATION, WITHOUT LONG-TERM CURRENT USE OF INSULIN (HCC): ICD-10-CM

## 2018-05-24 LAB
BILIRUBIN URINE: NEGATIVE
BLOOD, URINE: NEGATIVE
CLARITY: CLEAR
COLOR: YELLOW
GLUCOSE URINE: NEGATIVE MG/DL
KETONES, URINE: NEGATIVE MG/DL
LEUKOCYTE ESTERASE, URINE: NEGATIVE
MICROSCOPIC EXAMINATION: NORMAL
NITRITE, URINE: NEGATIVE
PH UA: 7
PROTEIN UA: NEGATIVE MG/DL
SPECIFIC GRAVITY UA: 1.02
URINE TYPE: NORMAL
UROBILINOGEN, URINE: 0.2 E.U./DL

## 2018-06-21 RX ORDER — IRON POLYSACCHARIDE COMPLEX 180 MG
CAPSULE ORAL
Qty: 30 CAPSULE | Refills: 0 | Status: SHIPPED | OUTPATIENT
Start: 2018-06-21 | End: 2018-07-03 | Stop reason: SDUPTHER

## 2018-07-03 RX ORDER — IRON POLYSACCHARIDE COMPLEX 180 MG
CAPSULE ORAL
Qty: 30 CAPSULE | Refills: 0 | Status: SHIPPED | OUTPATIENT
Start: 2018-07-03 | End: 2018-08-21 | Stop reason: SDUPTHER

## 2018-08-22 RX ORDER — IRON POLYSACCHARIDE COMPLEX 180 MG
CAPSULE ORAL
Qty: 30 CAPSULE | Refills: 0 | Status: SHIPPED | OUTPATIENT
Start: 2018-08-22 | End: 2018-09-04 | Stop reason: SDUPTHER

## 2018-09-04 ENCOUNTER — OFFICE VISIT (OUTPATIENT)
Dept: PRIMARY CARE CLINIC | Age: 83
End: 2018-09-04

## 2018-09-04 VITALS
SYSTOLIC BLOOD PRESSURE: 130 MMHG | TEMPERATURE: 97.3 F | BODY MASS INDEX: 25.58 KG/M2 | HEART RATE: 85 BPM | WEIGHT: 193 LBS | OXYGEN SATURATION: 97 % | HEIGHT: 73 IN | DIASTOLIC BLOOD PRESSURE: 60 MMHG

## 2018-09-04 DIAGNOSIS — Z23 NEED FOR SHINGLES VACCINE: ICD-10-CM

## 2018-09-04 DIAGNOSIS — E78.2 MIXED HYPERLIPIDEMIA: ICD-10-CM

## 2018-09-04 DIAGNOSIS — K21.9 GASTROESOPHAGEAL REFLUX DISEASE WITHOUT ESOPHAGITIS: ICD-10-CM

## 2018-09-04 DIAGNOSIS — J30.1 SEASONAL ALLERGIC RHINITIS DUE TO POLLEN: ICD-10-CM

## 2018-09-04 DIAGNOSIS — D50.8 OTHER IRON DEFICIENCY ANEMIA: ICD-10-CM

## 2018-09-04 DIAGNOSIS — I10 ESSENTIAL HYPERTENSION: ICD-10-CM

## 2018-09-04 DIAGNOSIS — Z23 FLU VACCINE NEED: ICD-10-CM

## 2018-09-04 DIAGNOSIS — B68.9: ICD-10-CM

## 2018-09-04 DIAGNOSIS — E11.9 TYPE 2 DIABETES MELLITUS WITHOUT COMPLICATION, WITHOUT LONG-TERM CURRENT USE OF INSULIN (HCC): ICD-10-CM

## 2018-09-04 PROCEDURE — 1123F ACP DISCUSS/DSCN MKR DOCD: CPT | Performed by: INTERNAL MEDICINE

## 2018-09-04 PROCEDURE — 99214 OFFICE O/P EST MOD 30 MIN: CPT | Performed by: INTERNAL MEDICINE

## 2018-09-04 PROCEDURE — 4040F PNEUMOC VAC/ADMIN/RCVD: CPT | Performed by: INTERNAL MEDICINE

## 2018-09-04 PROCEDURE — G8427 DOCREV CUR MEDS BY ELIG CLIN: HCPCS | Performed by: INTERNAL MEDICINE

## 2018-09-04 PROCEDURE — G8419 CALC BMI OUT NRM PARAM NOF/U: HCPCS | Performed by: INTERNAL MEDICINE

## 2018-09-04 PROCEDURE — 1101F PT FALLS ASSESS-DOCD LE1/YR: CPT | Performed by: INTERNAL MEDICINE

## 2018-09-04 PROCEDURE — 1036F TOBACCO NON-USER: CPT | Performed by: INTERNAL MEDICINE

## 2018-09-04 RX ORDER — CLOTRIMAZOLE AND BETAMETHASONE DIPROPIONATE 10; .64 MG/G; MG/G
CREAM TOPICAL
Qty: 1 G | Refills: 3 | Status: SHIPPED | OUTPATIENT
Start: 2018-09-04 | End: 2020-08-17

## 2018-09-04 RX ORDER — OMEPRAZOLE 40 MG/1
40 CAPSULE, DELAYED RELEASE ORAL DAILY PRN
Qty: 30 CAPSULE | Refills: 6 | Status: SHIPPED | OUTPATIENT
Start: 2018-09-04 | End: 2019-04-25 | Stop reason: SDUPTHER

## 2018-09-04 RX ORDER — PRAVASTATIN SODIUM 20 MG
20 TABLET ORAL DAILY
Qty: 30 TABLET | Refills: 6 | Status: SHIPPED | OUTPATIENT
Start: 2018-09-04 | End: 2018-11-27 | Stop reason: SDUPTHER

## 2018-09-04 RX ORDER — FLUTICASONE PROPIONATE 50 MCG
1 SPRAY, SUSPENSION (ML) NASAL DAILY
Qty: 1 BOTTLE | Refills: 3 | Status: SHIPPED | OUTPATIENT
Start: 2018-09-04 | End: 2020-08-17

## 2018-09-04 RX ORDER — NIFEDIPINE 60 MG/1
60 TABLET, EXTENDED RELEASE ORAL DAILY
Qty: 30 TABLET | Refills: 6 | Status: SHIPPED | OUTPATIENT
Start: 2018-09-04 | End: 2018-11-27 | Stop reason: SDUPTHER

## 2018-09-04 RX ORDER — MONTELUKAST SODIUM 10 MG/1
10 TABLET ORAL DAILY
Qty: 30 TABLET | Refills: 3 | Status: SHIPPED | OUTPATIENT
Start: 2018-09-04 | End: 2020-08-17

## 2018-09-04 RX ORDER — FEXOFENADINE HCL 180 MG/1
180 TABLET ORAL DAILY
Qty: 30 TABLET | Refills: 3 | Status: SHIPPED | OUTPATIENT
Start: 2018-09-04 | End: 2020-08-17

## 2018-09-04 RX ORDER — ASPIRIN 81 MG/1
81 TABLET ORAL DAILY
Qty: 90 TABLET | Refills: 11 | Status: SHIPPED | OUTPATIENT
Start: 2018-09-04 | End: 2020-08-17

## 2018-09-04 ASSESSMENT — ENCOUNTER SYMPTOMS
CHEST TIGHTNESS: 0
SHORTNESS OF BREATH: 0
DIARRHEA: 0
SINUS PRESSURE: 1
EYES NEGATIVE: 1
CONSTIPATION: 0
WHEEZING: 0
GASTROINTESTINAL NEGATIVE: 1
COLOR CHANGE: 1
RHINORRHEA: 1
BLOOD IN STOOL: 0

## 2018-09-04 NOTE — PROGRESS NOTES
Subjective:      Patient ID: Hema Brantley is a 80 y.o. male. 5/22/18 Patient presents with:  Bleeding/Bruising: c/o bruise left leg for 3 weeks. Allergic Rhinitis : flare up  Diabetes: no complaints   Hypertension: taking meds reg               Last seen  1/23/18  Patient presents with:  Diabetes  Hypertension  Hearing Problem    Symbicort helped tremendously! 11/13/17              Diabetes   Pertinent negatives for hypoglycemia include no dizziness, headaches, nervousness/anxiousness or tremors. Pertinent negatives for diabetes include no chest pain, no fatigue and no weakness. Review of Systems   Constitutional: Negative for chills, fatigue and fever. Pneumovac 2007. PREVNAR 2/17    Tdap 9/17    Flu vac  9/17    Zostavac / script    HENT: Positive for hearing loss, rhinorrhea, sinus pressure and sneezing. Dentures . Eyes: Negative. Eye Ex  9/16    Cataract surgery planned ? ?? Respiratory: Negative for chest tightness, shortness of breath and wheezing. Does not smoke    Cardiovascular: Negative. Negative for chest pain. HTN. No CAD   Gastrointestinal: Negative. Negative for blood in stool, constipation and diarrhea. Repeat Colonoscopy 10/15 . Dr Trey Main . Polyps   Endocrine:        Diabetes > 10 yrs    Simple cysts in Thyroid US / 3/15   Genitourinary: Negative for dysuria and urgency. H/O Prostate Cancer ( 2004)  Saw Dr Dorian Rincon  . 2/16    Musculoskeletal: Positive for arthralgias. Sees Podiatrist regularly  q 6 mths    Skin: Positive for color change (leg). Negative for rash. Allergic/Immunologic: Negative for food allergies. Neurological: Negative for dizziness, tremors, weakness, light-headedness and headaches. Psychiatric/Behavioral: Negative for behavioral problems and sleep disturbance. The patient is not nervous/anxious. Objective:   Physical Exam   Constitutional: He is oriented to person, place, and time. No distress. Wt loss steady   HENT:   Nose: Mucosal edema and rhinorrhea present. Mouth/Throat: No oropharyngeal exudate. Eyes: Conjunctivae are normal.   Cardiovascular: Normal rate, regular rhythm and normal heart sounds. Pulmonary/Chest: Breath sounds normal. He has no wheezes. Abdominal: He exhibits distension. There is no tenderness. Musculoskeletal: Normal range of motion. He exhibits no edema. Neurological: He is alert and oriented to person, place, and time. He has normal strength. Skin: Skin is warm. Rash (left calf ; scaly ,central clearing) noted. Psychiatric: He has a normal mood and affect. His behavior is normal.   Vitals reviewed. Assessment:      Krystian Shannon was seen today for bleeding/bruising, allergic rhinitis , diabetes and hypertension. Diagnoses and all orders for this visit:    [place]  -     clotrimazole-betamethasone (LOTRISONE) 1-0.05 % cream; Apply topically 2 times daily. Seasonal allergic rhinitis due to pollen  -     montelukast (SINGULAIR) 10 MG tablet; Take 1 tablet by mouth daily  -     fexofenadine (ALLEGRA) 180 MG tablet; Take 1 tablet by mouth daily  -     fluticasone (FLONASE) 50 MCG/ACT nasal spray; 1 spray by Nasal route daily    Type 2 diabetes mellitus without complication, without long-term current use of insulin (HCC)  -     metFORMIN (GLUCOPHAGE) 500 MG tablet; Take 1 tablet by mouth daily (with breakfast)  -     pravastatin (PRAVACHOL) 20 MG tablet; Take 1 tablet by mouth daily  -     aspirin 81 MG EC tablet; Take 1 tablet by mouth daily    Essential hypertension controlled   -     NIFEdipine (NIFEDICAL XL) 60 MG extended release tablet; Take 1 tablet by mouth daily    Mixed hyperlipidemia controlled   -     pravastatin (PRAVACHOL) 20 MG tablet; Take 1 tablet by mouth daily    Gastroesophageal reflux disease without esophagitis  -     omeprazole (PRILOSEC) 40 MG delayed release capsule;  Take 1 capsule by mouth daily as needed (GERD)    Need for

## 2018-11-27 ENCOUNTER — OFFICE VISIT (OUTPATIENT)
Dept: PRIMARY CARE CLINIC | Age: 83
End: 2018-11-27
Payer: MEDICARE

## 2018-11-27 VITALS
BODY MASS INDEX: 25.52 KG/M2 | TEMPERATURE: 96.8 F | HEART RATE: 87 BPM | WEIGHT: 193.4 LBS | SYSTOLIC BLOOD PRESSURE: 125 MMHG | DIASTOLIC BLOOD PRESSURE: 70 MMHG

## 2018-11-27 DIAGNOSIS — E11.9 TYPE 2 DIABETES MELLITUS WITHOUT COMPLICATION, WITHOUT LONG-TERM CURRENT USE OF INSULIN (HCC): Primary | ICD-10-CM

## 2018-11-27 DIAGNOSIS — Z71.89 COUNSELING ON INJURY PREVENTION: ICD-10-CM

## 2018-11-27 DIAGNOSIS — E78.2 MIXED HYPERLIPIDEMIA: ICD-10-CM

## 2018-11-27 DIAGNOSIS — E11.9 TYPE 2 DIABETES MELLITUS WITHOUT COMPLICATION, WITHOUT LONG-TERM CURRENT USE OF INSULIN (HCC): ICD-10-CM

## 2018-11-27 DIAGNOSIS — I10 ESSENTIAL HYPERTENSION: ICD-10-CM

## 2018-11-27 PROCEDURE — 1123F ACP DISCUSS/DSCN MKR DOCD: CPT | Performed by: INTERNAL MEDICINE

## 2018-11-27 PROCEDURE — G8419 CALC BMI OUT NRM PARAM NOF/U: HCPCS | Performed by: INTERNAL MEDICINE

## 2018-11-27 PROCEDURE — G8428 CUR MEDS NOT DOCUMENT: HCPCS | Performed by: INTERNAL MEDICINE

## 2018-11-27 PROCEDURE — 1101F PT FALLS ASSESS-DOCD LE1/YR: CPT | Performed by: INTERNAL MEDICINE

## 2018-11-27 PROCEDURE — G8484 FLU IMMUNIZE NO ADMIN: HCPCS | Performed by: INTERNAL MEDICINE

## 2018-11-27 PROCEDURE — 1036F TOBACCO NON-USER: CPT | Performed by: INTERNAL MEDICINE

## 2018-11-27 PROCEDURE — 99214 OFFICE O/P EST MOD 30 MIN: CPT | Performed by: INTERNAL MEDICINE

## 2018-11-27 PROCEDURE — 4040F PNEUMOC VAC/ADMIN/RCVD: CPT | Performed by: INTERNAL MEDICINE

## 2018-11-27 RX ORDER — PRAVASTATIN SODIUM 20 MG
20 TABLET ORAL DAILY
Qty: 30 TABLET | Refills: 6 | Status: SHIPPED | OUTPATIENT
Start: 2018-11-27 | End: 2019-04-25 | Stop reason: SDUPTHER

## 2018-11-27 RX ORDER — NIFEDIPINE 60 MG/1
60 TABLET, EXTENDED RELEASE ORAL DAILY
Qty: 30 TABLET | Refills: 6 | Status: SHIPPED | OUTPATIENT
Start: 2018-11-27 | End: 2019-04-25 | Stop reason: SDUPTHER

## 2018-11-27 ASSESSMENT — ENCOUNTER SYMPTOMS
DIARRHEA: 0
WHEEZING: 0
GASTROINTESTINAL NEGATIVE: 1
CONSTIPATION: 0
EYES NEGATIVE: 1
CHEST TIGHTNESS: 0
BLOOD IN STOOL: 0
SHORTNESS OF BREATH: 0

## 2018-11-27 ASSESSMENT — PATIENT HEALTH QUESTIONNAIRE - PHQ9
SUM OF ALL RESPONSES TO PHQ QUESTIONS 1-9: 0
1. LITTLE INTEREST OR PLEASURE IN DOING THINGS: 0
SUM OF ALL RESPONSES TO PHQ QUESTIONS 1-9: 0

## 2018-11-27 NOTE — PROGRESS NOTES
motion. He exhibits no edema. Neurological: He is alert and oriented to person, place, and time. He has normal strength. Skin: Skin is warm and dry. Psychiatric: He has a normal mood and affect. His behavior is normal. Cognition and memory are normal.   Vitals reviewed. Assessment:      Edilberto Troncoso was seen today for 6 month follow-up. Diagnoses and all orders for this visit:    Type 2 diabetes mellitus without complication, without long-term current use of insulin ([facility])  Last A1C 5.7  -     Hemoglobin A1C; Future  -     metFORMIN (GLUCOPHAGE) 500 MG tablet; Take 1 tablet by mouth daily (with breakfast)  -     pravastatin (PRAVACHOL) 20 MG tablet; Take 1 tablet by mouth daily  -     NIFEdipine (NIFEDICAL XL) 60 MG extended release tablet; Take 1 tablet by mouth daily    Essential hypertension  Controlled   -     NIFEdipine (NIFEDICAL XL) 60 MG extended release tablet; Take 1 tablet by mouth daily    Mixed hyperlipidemia  Controlled   -     pravastatin (PRAVACHOL) 20 MG tablet; Take 1 tablet by mouth daily    Counseling on injury prevention            CA of prostate [facility]) c/o Urology                  -     -    Self Management Goals    Know which medication is for what condition:   Know side effects of medications, and discuss with doctor   Discuss side effects and instructions on new medications  Know correct dose/frequency of medications  Take medications at the same time each day  Stay current on medication refills  If taking OTC's check with MD/pharmacy first about interactions  Monitor sugars and record time/meal  A1C goal 7.0 or less  LDL goal 578 or less  Systolic BP < or equal to 472  Diastolic BP < or equal to 85    Current Flu and Pneumonia Vax  Set targets for weight loss 4 lbs per month  Exercise 3-5 times per week  Keep check of weight  Weighting machine    On a scale of 1 to 5 how confident are you in these goals?   5/5       Education materials given  Diabetic Education

## 2018-11-28 LAB
ESTIMATED AVERAGE GLUCOSE: 116.9 MG/DL
HBA1C MFR BLD: 5.7 %

## 2019-04-25 ENCOUNTER — OFFICE VISIT (OUTPATIENT)
Dept: PRIMARY CARE CLINIC | Age: 84
End: 2019-04-25
Payer: MEDICARE

## 2019-04-25 VITALS
BODY MASS INDEX: 24.94 KG/M2 | HEART RATE: 74 BPM | SYSTOLIC BLOOD PRESSURE: 130 MMHG | DIASTOLIC BLOOD PRESSURE: 68 MMHG | WEIGHT: 189 LBS

## 2019-04-25 DIAGNOSIS — E11.9 TYPE 2 DIABETES MELLITUS WITHOUT COMPLICATION, WITHOUT LONG-TERM CURRENT USE OF INSULIN (HCC): ICD-10-CM

## 2019-04-25 DIAGNOSIS — I10 ESSENTIAL HYPERTENSION: ICD-10-CM

## 2019-04-25 DIAGNOSIS — J40 BRONCHITIS: Primary | ICD-10-CM

## 2019-04-25 DIAGNOSIS — J40 BRONCHITIS: ICD-10-CM

## 2019-04-25 DIAGNOSIS — Z86.010 HISTORY OF COLON POLYPS: ICD-10-CM

## 2019-04-25 DIAGNOSIS — D50.8 OTHER IRON DEFICIENCY ANEMIA: ICD-10-CM

## 2019-04-25 DIAGNOSIS — K21.9 GASTROESOPHAGEAL REFLUX DISEASE WITHOUT ESOPHAGITIS: ICD-10-CM

## 2019-04-25 DIAGNOSIS — E78.2 MIXED HYPERLIPIDEMIA: ICD-10-CM

## 2019-04-25 LAB
A/G RATIO: 1.3 (ref 1.1–2.2)
ALBUMIN SERPL-MCNC: 4.4 G/DL (ref 3.4–5)
ALP BLD-CCNC: 73 U/L (ref 40–129)
ALT SERPL-CCNC: 30 U/L (ref 10–40)
ANION GAP SERPL CALCULATED.3IONS-SCNC: 14 MMOL/L (ref 3–16)
AST SERPL-CCNC: 25 U/L (ref 15–37)
BASOPHILS ABSOLUTE: 0 K/UL (ref 0–0.2)
BASOPHILS RELATIVE PERCENT: 1.1 %
BILIRUB SERPL-MCNC: 0.5 MG/DL (ref 0–1)
BUN BLDV-MCNC: 10 MG/DL (ref 7–20)
CALCIUM SERPL-MCNC: 9.8 MG/DL (ref 8.3–10.6)
CHLORIDE BLD-SCNC: 103 MMOL/L (ref 99–110)
CHOLESTEROL, TOTAL: 162 MG/DL (ref 0–199)
CO2: 25 MMOL/L (ref 21–32)
CREAT SERPL-MCNC: 1.1 MG/DL (ref 0.8–1.3)
EOSINOPHILS ABSOLUTE: 0.4 K/UL (ref 0–0.6)
EOSINOPHILS RELATIVE PERCENT: 11.3 %
GFR AFRICAN AMERICAN: >60
GFR NON-AFRICAN AMERICAN: >60
GLOBULIN: 3.3 G/DL
GLUCOSE BLD-MCNC: 104 MG/DL (ref 70–99)
HCT VFR BLD CALC: 37.8 % (ref 40.5–52.5)
HDLC SERPL-MCNC: 43 MG/DL (ref 40–60)
HEMOGLOBIN: 12.9 G/DL (ref 13.5–17.5)
LDL CHOLESTEROL CALCULATED: 90 MG/DL
LYMPHOCYTES ABSOLUTE: 0.9 K/UL (ref 1–5.1)
LYMPHOCYTES RELATIVE PERCENT: 25.4 %
MCH RBC QN AUTO: 32.8 PG (ref 26–34)
MCHC RBC AUTO-ENTMCNC: 34.1 G/DL (ref 31–36)
MCV RBC AUTO: 96.3 FL (ref 80–100)
MONOCYTES ABSOLUTE: 0.3 K/UL (ref 0–1.3)
MONOCYTES RELATIVE PERCENT: 9.6 %
NEUTROPHILS ABSOLUTE: 1.9 K/UL (ref 1.7–7.7)
NEUTROPHILS RELATIVE PERCENT: 52.6 %
PDW BLD-RTO: 13.5 % (ref 12.4–15.4)
PLATELET # BLD: 250 K/UL (ref 135–450)
PMV BLD AUTO: 8 FL (ref 5–10.5)
POTASSIUM SERPL-SCNC: 4.1 MMOL/L (ref 3.5–5.1)
RBC # BLD: 3.92 M/UL (ref 4.2–5.9)
SODIUM BLD-SCNC: 142 MMOL/L (ref 136–145)
TOTAL PROTEIN: 7.7 G/DL (ref 6.4–8.2)
TRIGL SERPL-MCNC: 144 MG/DL (ref 0–150)
TSH SERPL DL<=0.05 MIU/L-ACNC: 2.85 UIU/ML (ref 0.27–4.2)
VLDLC SERPL CALC-MCNC: 29 MG/DL
WBC # BLD: 3.6 K/UL (ref 4–11)

## 2019-04-25 PROCEDURE — 1123F ACP DISCUSS/DSCN MKR DOCD: CPT | Performed by: INTERNAL MEDICINE

## 2019-04-25 PROCEDURE — G8420 CALC BMI NORM PARAMETERS: HCPCS | Performed by: INTERNAL MEDICINE

## 2019-04-25 PROCEDURE — 99214 OFFICE O/P EST MOD 30 MIN: CPT | Performed by: INTERNAL MEDICINE

## 2019-04-25 PROCEDURE — 4040F PNEUMOC VAC/ADMIN/RCVD: CPT | Performed by: INTERNAL MEDICINE

## 2019-04-25 PROCEDURE — G8427 DOCREV CUR MEDS BY ELIG CLIN: HCPCS | Performed by: INTERNAL MEDICINE

## 2019-04-25 PROCEDURE — 1036F TOBACCO NON-USER: CPT | Performed by: INTERNAL MEDICINE

## 2019-04-25 RX ORDER — METHYLPREDNISOLONE 4 MG/1
TABLET ORAL
Qty: 21 TABLET | Refills: 0 | Status: SHIPPED | OUTPATIENT
Start: 2019-04-25 | End: 2019-05-01

## 2019-04-25 RX ORDER — BUDESONIDE AND FORMOTEROL FUMARATE DIHYDRATE 160; 4.5 UG/1; UG/1
2 AEROSOL RESPIRATORY (INHALATION) DAILY
Qty: 1 INHALER | Refills: 5 | Status: SHIPPED | OUTPATIENT
Start: 2019-04-25 | End: 2020-08-17

## 2019-04-25 RX ORDER — PRAVASTATIN SODIUM 20 MG
20 TABLET ORAL DAILY
Qty: 30 TABLET | Refills: 6 | Status: SHIPPED | OUTPATIENT
Start: 2019-04-25 | End: 2019-10-03 | Stop reason: SDUPTHER

## 2019-04-25 RX ORDER — AZITHROMYCIN 250 MG/1
250 TABLET, FILM COATED ORAL SEE ADMIN INSTRUCTIONS
Qty: 6 TABLET | Refills: 0 | Status: SHIPPED | OUTPATIENT
Start: 2019-04-25 | End: 2019-04-30

## 2019-04-25 RX ORDER — OMEPRAZOLE 40 MG/1
40 CAPSULE, DELAYED RELEASE ORAL DAILY PRN
Qty: 30 CAPSULE | Refills: 6 | Status: SHIPPED | OUTPATIENT
Start: 2019-04-25 | End: 2019-12-23

## 2019-04-25 RX ORDER — TAMSULOSIN HYDROCHLORIDE 0.4 MG/1
0.4 CAPSULE ORAL DAILY
COMMUNITY
End: 2019-10-03 | Stop reason: SDUPTHER

## 2019-04-25 RX ORDER — NIFEDIPINE 60 MG/1
60 TABLET, EXTENDED RELEASE ORAL DAILY
Qty: 90 TABLET | Refills: 6 | Status: SHIPPED | OUTPATIENT
Start: 2019-04-25 | End: 2019-05-20

## 2019-04-25 ASSESSMENT — ENCOUNTER SYMPTOMS
GASTROINTESTINAL NEGATIVE: 1
DIARRHEA: 0
CONSTIPATION: 0
EYES NEGATIVE: 1
SHORTNESS OF BREATH: 0
CHEST TIGHTNESS: 0
BLOOD IN STOOL: 0
WHEEZING: 1
COUGH: 1

## 2019-04-25 NOTE — PROGRESS NOTES
Normal rate, regular rhythm and normal heart sounds. Pulmonary/Chest: He has wheezes. Abdominal: There is no tenderness. Musculoskeletal: Normal range of motion. He exhibits no edema. Neurological: He is alert and oriented to person, place, and time. He has normal strength. Skin: Skin is warm and dry. Psychiatric: He has a normal mood and affect. His behavior is normal. Cognition and memory are normal.   Vitals reviewed. Assessment:      Justine Barnes was seen today for cough, hypertension and diabetes. Diagnoses and all orders for this visit:    Bronchitis  Samples of Symbicort Inhalers  -     azithromycin (ZITHROMAX) 250 MG tablet; Take 1 tablet by mouth See Admin Instructions for 5 days 500mg on day 1 followed by 250mg on days 2 - 5  -     CBC Auto Differential; Future  -     methylPREDNISolone (MEDROL DOSEPACK) 4 MG tablet; Take by mouth. -     budesonide-formoterol (SYMBICORT) 160-4.5 MCG/ACT AERO; Inhale 2 puffs into the lungs daily    Type 2 diabetes mellitus without complication, without long-term current use of insulin ([facility])  -     Comprehensive Metabolic Panel; Future  -     Hemoglobin A1C; Future  -     Lipid Panel; Future  -     TSH without Reflex; Future  -     pravastatin (PRAVACHOL) 20 MG tablet; Take 1 tablet by mouth daily  -     metFORMIN (GLUCOPHAGE) 500 MG tablet; Take 1 tablet by mouth daily (with breakfast)    Mixed hyperlipidemia  -     pravastatin (PRAVACHOL) 20 MG tablet; Take 1 tablet by mouth daily    Gastroesophageal reflux disease without esophagitis  -     omeprazole (PRILOSEC) 40 MG delayed release capsule; Take 1 capsule by mouth daily as needed (GERD)    History of colon polyps  -     AFL - James Man MD, Gastroenterology, [facility]    Other iron deficiency anemia  -     omeprazole (PRILOSEC) 40 MG delayed release capsule;  Take 1 capsule by mouth daily as needed (GERD)      Essential hypertension  Controlled   -     NIFEdipine (NIFEDICAL XL) 60 MG

## 2019-04-26 LAB
ESTIMATED AVERAGE GLUCOSE: 119.8 MG/DL
HBA1C MFR BLD: 5.8 %

## 2019-05-07 DIAGNOSIS — D50.8 OTHER IRON DEFICIENCY ANEMIA: ICD-10-CM

## 2019-05-07 RX ORDER — IRON POLYSACCHARIDE COMPLEX 180 MG
CAPSULE ORAL
Qty: 30 CAPSULE | Refills: 5 | Status: SHIPPED | OUTPATIENT
Start: 2019-05-07 | End: 2019-10-28 | Stop reason: SDUPTHER

## 2019-05-20 ENCOUNTER — OFFICE VISIT (OUTPATIENT)
Dept: PRIMARY CARE CLINIC | Age: 84
End: 2019-05-20
Payer: MEDICARE

## 2019-05-20 VITALS
HEIGHT: 73 IN | DIASTOLIC BLOOD PRESSURE: 50 MMHG | RESPIRATION RATE: 18 BRPM | OXYGEN SATURATION: 98 % | HEART RATE: 78 BPM | SYSTOLIC BLOOD PRESSURE: 90 MMHG | BODY MASS INDEX: 25.23 KG/M2 | WEIGHT: 190.4 LBS

## 2019-05-20 DIAGNOSIS — I10 ESSENTIAL HYPERTENSION: ICD-10-CM

## 2019-05-20 DIAGNOSIS — E11.9 TYPE 2 DIABETES MELLITUS WITHOUT COMPLICATION, WITHOUT LONG-TERM CURRENT USE OF INSULIN (HCC): ICD-10-CM

## 2019-05-20 DIAGNOSIS — R60.0 LOCALIZED EDEMA: ICD-10-CM

## 2019-05-20 PROCEDURE — G8419 CALC BMI OUT NRM PARAM NOF/U: HCPCS | Performed by: INTERNAL MEDICINE

## 2019-05-20 PROCEDURE — 1036F TOBACCO NON-USER: CPT | Performed by: INTERNAL MEDICINE

## 2019-05-20 PROCEDURE — G8427 DOCREV CUR MEDS BY ELIG CLIN: HCPCS | Performed by: INTERNAL MEDICINE

## 2019-05-20 PROCEDURE — 99213 OFFICE O/P EST LOW 20 MIN: CPT | Performed by: INTERNAL MEDICINE

## 2019-05-20 PROCEDURE — 4040F PNEUMOC VAC/ADMIN/RCVD: CPT | Performed by: INTERNAL MEDICINE

## 2019-05-20 PROCEDURE — 1123F ACP DISCUSS/DSCN MKR DOCD: CPT | Performed by: INTERNAL MEDICINE

## 2019-05-20 RX ORDER — NIFEDIPINE 30 MG/1
30 TABLET, FILM COATED, EXTENDED RELEASE ORAL DAILY
Qty: 30 TABLET | Refills: 5 | Status: SHIPPED | OUTPATIENT
Start: 2019-05-20 | End: 2019-10-03 | Stop reason: SDUPTHER

## 2019-05-20 ASSESSMENT — ENCOUNTER SYMPTOMS
GASTROINTESTINAL NEGATIVE: 1
CONSTIPATION: 0
BLOOD IN STOOL: 0
DIARRHEA: 0
COUGH: 0
EYES NEGATIVE: 1
CHEST TIGHTNESS: 0
WHEEZING: 0
SHORTNESS OF BREATH: 0

## 2019-05-20 ASSESSMENT — PATIENT HEALTH QUESTIONNAIRE - PHQ9
SUM OF ALL RESPONSES TO PHQ9 QUESTIONS 1 & 2: 0
1. LITTLE INTEREST OR PLEASURE IN DOING THINGS: 0
SUM OF ALL RESPONSES TO PHQ QUESTIONS 1-9: 0
2. FEELING DOWN, DEPRESSED OR HOPELESS: 0
SUM OF ALL RESPONSES TO PHQ QUESTIONS 1-9: 0

## 2019-05-20 NOTE — PROGRESS NOTES
Subjective:      Patient ID: Louis Ling is a 80 y.o. male. 5/20/19 Patient presents with: Foot Swelling: bilateral swelling  Joint Swelling: bilateral swelling    Was sent over by Podiatrist !   No issues personally        Last seen  /25/19 Patient presents with:  Cough: sick for >>> Waited till F/U appt !!!  Hypertension  Diabetes                11/27/18 Patient presents with:  6 Month Follow-Up for Diabetes / HTN     Taking meds reg . No complaints  Fell once after tripping over carpet! No anxiety /depresion             Last seen  9/18         Diabetes   Pertinent negatives for hypoglycemia include no dizziness, headaches, nervousness/anxiousness or tremors. Pertinent negatives for diabetes include no chest pain, no fatigue and no weakness. Review of Systems   Constitutional: Negative for chills, fatigue and fever. Pneumovac 2007. PREVNAR 2/17    Tdap 9/17    Flu vac  9/18    Shingrex  9/18;    HENT:        Dentures . Eyes: Negative. Eye Ex  9/18       Respiratory: Negative for cough, chest tightness, shortness of breath and wheezing. Does not smoke    Cardiovascular: Negative. Negative for chest pain. HTN. No CAD   Gastrointestinal: Negative. Negative for blood in stool, constipation and diarrhea. Repeat Colonoscopy 10/15 . Dr Briseyda Oneal . Polyps   Endocrine:        Diabetes > 10 yrs    Simple cysts in Thyroid US / 3/15   Genitourinary: Negative for dysuria and urgency. H/O Prostate Cancer ( 2004)  Saw Dr Camila Ross  . 2/17   Musculoskeletal: Positive for arthralgias. Sees Podiatrist regularly  q 6 mths    Skin: Negative for rash. Allergic/Immunologic: Negative for food allergies. Neurological: Negative for dizziness, tremors, weakness, light-headedness and headaches. Psychiatric/Behavioral: Negative for behavioral problems and sleep disturbance. The patient is not nervous/anxious.           Objective:   Physical Exam   Constitutional: He is oriented to person, place, and time. No distress. Eyes: Conjunctivae are normal.   Cardiovascular: Normal rate, regular rhythm and normal heart sounds. Pulmonary/Chest: Breath sounds normal. He has no wheezes. He has no rales. Abdominal: There is no tenderness. Musculoskeletal: Normal range of motion. He exhibits edema (mild / ankles). Neurological: He is alert and oriented to person, place, and time. He has normal strength. Skin: Skin is warm and dry. Psychiatric: He has a normal mood and affect. His behavior is normal. Cognition and memory are normal.   Vitals reviewed. Assessment:      Niecy Toussaint was seen today for foot swelling and joint swelling. Diagnoses and all orders for this visit:    Localized edema  SE of Nifedipine . Keep legs elevated when sitting     Essential hypertension  Low Nl . Reduce Nifedipine to 30 mg / d   -     NIFEdipine (ADALAT CC) 30 MG extended release tablet; Take 1 tablet by mouth daily        Type 2 diabetes mellitus without complication, without long-term current use of insulin ([facility]) A1C 5.8     -     pravastatin (PRAVACHOL) 20 MG tablet; Take 1 tablet by mouth daily  -     metFORMIN (GLUCOPHAGE) 500 MG tablet; Take 1 tablet by mouth daily (with breakfast)    Mixed hyperlipidemia Controlled   -     pravastatin (PRAVACHOL) 20 MG tablet; Take 1 tablet by mouth daily    Gastroesophageal reflux disease without esophagitis  -     omeprazole (PRILOSEC) 40 MG delayed release capsule; Take 1 capsule by mouth daily as needed (GERD)    History of colon polyps  -     AFL - Aristides Faith MD, Gastroenterology, [facility]    Other iron deficiency anemia  -     omeprazole (PRILOSEC) 40 MG delayed release capsule; Take 1 capsule by mouth daily as needed (GERD)        CA of prostate [facility]) c/o Urology .  Now on Flomax                   -     -    Self Management Goals    Know which medication is for what condition:   Know side effects of medications, and discuss with doctor   Discuss side effects and instructions on new medications  Know correct dose/frequency of medications  Take medications at the same time each day  Stay current on medication refills  If taking OTC's check with MD/pharmacy first about interactions  Monitor sugars and record time/meal  A1C goal 7.0 or less  LDL goal 252 or less  Systolic BP < or equal to 812  Diastolic BP < or equal to 85    Current Flu and Pneumonia Vax  Set targets for weight loss 4 lbs per month  Exercise 3-5 times per week  Keep check of weight  Weighting machine    On a scale of 1 to 5 how confident are you in these goals?   5/5       Education materials given  Diabetic Education Referral   [phone]

## 2019-06-04 RX ORDER — M-VIT,TX,IRON,MINS/CALC/FOLIC 27MG-0.4MG
1 TABLET ORAL DAILY
COMMUNITY
End: 2020-08-17

## 2019-06-04 NOTE — PROGRESS NOTES
[facility] time___1200_________        Surgery time__1300__________    Take the following medications with a sip of water:ADALAT, OMEPRAZOLE    Do not eat or drink anything after 12:00 midnight prior to your surgery. EXCEPT PREP  This includes water chewing gum, mints and ice chips. You may brush your teeth and gargle the morning of your surgery, but do not swallow the water    You may be asked to stop blood thinners such as Coumadin, Plavix, Fragmin, Lovenox, etc., or any anti-inflammatories such as:  Aspirin, Ibuprofen, Advil, Naproxen prior to your surgery. We also ask that you stop any OTC medications such as fish oil, vitamin E, glucosamine, garlic, Multivitamins, COQ 10, etc.    We ask that you do not smoke 24 hours prior to surgery  We ask that you do not  drink any alcoholic beverages 24 hours prior to surgery     You must make arrangements for a responsible adult to take you home after your surgery. For your safety you will not be allowed to leave alone or drive yourself home. Your surgery will be cancelled if you do not have a ride home. Also for your safety, it is strongly suggested that someone stay with you the first 24 hours after your surgery. A parent or legal guardian must accompany a child scheduled for surgery and plan to stay at the hospital until the child is discharged. Please do not bring other children with you. For your comfort, please wear simple loose fitting clothing to the hospital.  Please do not bring valuables. Do not wear any make-up or nail polish on your fingers or toes      For your safety, please do not wear any jewelry or body piercing's on the day of surgery. All jewelry must be removed. If you have dentures, they will be removed before going to operating room. For your convenience, we will provide you with a container.     If you wear contact lenses or glasses, they will be removed, please bring a case for them. If you have a living will and a durable power of  for healthcare, please bring in a copy. As part of our patient safety program to minimize surgical site infections, we ask you to do the following:    · Please notify your surgeon if you develop any illness between         now and the  day of your surgery. · This includes a cough, cold, fever, sore throat, nausea,         or vomiting, and diarrhea, etc.  ·  Please notify your surgeon if you experience dizziness, shortness         of breath or blurred vision between now and the time of your surgery. You may shower the night before surgery or the morning of   your surgery with an antibacterial soap. You will need to bring a photo ID and insurance card    [facility] has an onsite pharmacy, would you like to utilize our pharmacy     If you will be staying overnight and use a C-pap machine, please bring   your C-pap to hospital     Our goal is to provide you with excellent care, therefore, visitors will be limited to two(2) in the room at a time so that we may focus on providing this care for you. Please contact pre-admission testing if you have any further questions. [facility] phone number:  [phone]  Please note these are generalized instructions for all surgical cases, you may be provided with more specific instructions according to your surgery.

## 2019-06-07 ENCOUNTER — ANESTHESIA EVENT (OUTPATIENT)
Dept: ENDOSCOPY | Age: 84
End: 2019-06-07
Payer: MEDICARE

## 2019-06-10 ENCOUNTER — HOSPITAL ENCOUNTER (OUTPATIENT)
Age: 84
Setting detail: OUTPATIENT SURGERY
Discharge: HOME | End: 2019-06-10
Attending: INTERNAL MEDICINE | Admitting: INTERNAL MEDICINE
Payer: MEDICARE

## 2019-06-10 ENCOUNTER — ANESTHESIA (OUTPATIENT)
Dept: ENDOSCOPY | Age: 84
End: 2019-06-10
Payer: MEDICARE

## 2019-06-10 VITALS
WEIGHT: 191.25 LBS | RESPIRATION RATE: 16 BRPM | HEIGHT: 73 IN | DIASTOLIC BLOOD PRESSURE: 74 MMHG | HEART RATE: 55 BPM | BODY MASS INDEX: 25.35 KG/M2 | OXYGEN SATURATION: 100 % | SYSTOLIC BLOOD PRESSURE: 159 MMHG | TEMPERATURE: 97 F

## 2019-06-10 VITALS
OXYGEN SATURATION: 100 % | SYSTOLIC BLOOD PRESSURE: 129 MMHG | RESPIRATION RATE: 12 BRPM | DIASTOLIC BLOOD PRESSURE: 73 MMHG

## 2019-06-10 DIAGNOSIS — Z86.010 HISTORY OF COLON POLYPS: ICD-10-CM

## 2019-06-10 LAB
GLUCOSE BLD-MCNC: 85 MG/DL (ref 70–99)
PERFORMED ON: NORMAL

## 2019-06-10 PROCEDURE — 2580000003 HC RX 258: Performed by: ANESTHESIOLOGY

## 2019-06-10 PROCEDURE — 7100000011 HC PHASE II RECOVERY - ADDTL 15 MIN: Performed by: INTERNAL MEDICINE

## 2019-06-10 PROCEDURE — 6360000002 HC RX W HCPCS: Performed by: NURSE ANESTHETIST, CERTIFIED REGISTERED

## 2019-06-10 PROCEDURE — 2709999900 HC NON-CHARGEABLE SUPPLY: Performed by: INTERNAL MEDICINE

## 2019-06-10 PROCEDURE — 7100000010 HC PHASE II RECOVERY - FIRST 15 MIN: Performed by: INTERNAL MEDICINE

## 2019-06-10 PROCEDURE — 3609010600 HC COLONOSCOPY POLYPECTOMY SNARE/COLD BIOPSY: Performed by: INTERNAL MEDICINE

## 2019-06-10 PROCEDURE — 3700000001 HC ADD 15 MINUTES (ANESTHESIA): Performed by: INTERNAL MEDICINE

## 2019-06-10 PROCEDURE — 2500000003 HC RX 250 WO HCPCS: Performed by: NURSE ANESTHETIST, CERTIFIED REGISTERED

## 2019-06-10 PROCEDURE — 88305 TISSUE EXAM BY PATHOLOGIST: CPT

## 2019-06-10 PROCEDURE — 3700000000 HC ANESTHESIA ATTENDED CARE: Performed by: INTERNAL MEDICINE

## 2019-06-10 RX ORDER — MEPERIDINE HYDROCHLORIDE 25 MG/ML
12.5 INJECTION INTRAMUSCULAR; INTRAVENOUS; SUBCUTANEOUS EVERY 5 MIN PRN
Status: DISCONTINUED | OUTPATIENT
Start: 2019-06-10 | End: 2019-06-10 | Stop reason: HOSPADM

## 2019-06-10 RX ORDER — OXYCODONE HYDROCHLORIDE 5 MG/1
10 TABLET ORAL PRN
Status: DISCONTINUED | OUTPATIENT
Start: 2019-06-10 | End: 2019-06-10 | Stop reason: HOSPADM

## 2019-06-10 RX ORDER — MORPHINE SULFATE 4 MG/ML
1 INJECTION, SOLUTION INTRAMUSCULAR; INTRAVENOUS EVERY 5 MIN PRN
Status: DISCONTINUED | OUTPATIENT
Start: 2019-06-10 | End: 2019-06-10 | Stop reason: HOSPADM

## 2019-06-10 RX ORDER — LIDOCAINE HYDROCHLORIDE 20 MG/ML
INJECTION, SOLUTION EPIDURAL; INFILTRATION; INTRACAUDAL; PERINEURAL PRN
Status: DISCONTINUED | OUTPATIENT
Start: 2019-06-10 | End: 2019-06-10 | Stop reason: SDUPTHER

## 2019-06-10 RX ORDER — OXYCODONE HYDROCHLORIDE 5 MG/1
5 TABLET ORAL PRN
Status: DISCONTINUED | OUTPATIENT
Start: 2019-06-10 | End: 2019-06-10 | Stop reason: HOSPADM

## 2019-06-10 RX ORDER — SODIUM CHLORIDE 0.9 % (FLUSH) 0.9 %
10 SYRINGE (ML) INJECTION EVERY 12 HOURS SCHEDULED
Status: DISCONTINUED | OUTPATIENT
Start: 2019-06-10 | End: 2019-06-10 | Stop reason: HOSPADM

## 2019-06-10 RX ORDER — FENTANYL CITRATE 50 UG/ML
50 INJECTION, SOLUTION INTRAMUSCULAR; INTRAVENOUS EVERY 5 MIN PRN
Status: DISCONTINUED | OUTPATIENT
Start: 2019-06-10 | End: 2019-06-10 | Stop reason: HOSPADM

## 2019-06-10 RX ORDER — SODIUM CHLORIDE 0.9 % (FLUSH) 0.9 %
10 SYRINGE (ML) INJECTION PRN
Status: DISCONTINUED | OUTPATIENT
Start: 2019-06-10 | End: 2019-06-10 | Stop reason: HOSPADM

## 2019-06-10 RX ORDER — FENTANYL CITRATE 50 UG/ML
25 INJECTION, SOLUTION INTRAMUSCULAR; INTRAVENOUS EVERY 5 MIN PRN
Status: DISCONTINUED | OUTPATIENT
Start: 2019-06-10 | End: 2019-06-10 | Stop reason: HOSPADM

## 2019-06-10 RX ORDER — PROPOFOL 10 MG/ML
INJECTION, EMULSION INTRAVENOUS PRN
Status: DISCONTINUED | OUTPATIENT
Start: 2019-06-10 | End: 2019-06-10 | Stop reason: SDUPTHER

## 2019-06-10 RX ORDER — SODIUM CHLORIDE 9 MG/ML
INJECTION, SOLUTION INTRAVENOUS CONTINUOUS
Status: DISCONTINUED | OUTPATIENT
Start: 2019-06-10 | End: 2019-06-10 | Stop reason: HOSPADM

## 2019-06-10 RX ORDER — ONDANSETRON 2 MG/ML
4 INJECTION INTRAMUSCULAR; INTRAVENOUS
Status: DISCONTINUED | OUTPATIENT
Start: 2019-06-10 | End: 2019-06-10 | Stop reason: HOSPADM

## 2019-06-10 RX ORDER — MORPHINE SULFATE 4 MG/ML
2 INJECTION, SOLUTION INTRAMUSCULAR; INTRAVENOUS EVERY 5 MIN PRN
Status: DISCONTINUED | OUTPATIENT
Start: 2019-06-10 | End: 2019-06-10 | Stop reason: HOSPADM

## 2019-06-10 RX ADMIN — PROPOFOL 20 MG: 10 INJECTION, EMULSION INTRAVENOUS at 13:49

## 2019-06-10 RX ADMIN — LIDOCAINE HYDROCHLORIDE 10 MG: 20 INJECTION, SOLUTION EPIDURAL; INFILTRATION; INTRACAUDAL; PERINEURAL at 13:52

## 2019-06-10 RX ADMIN — LIDOCAINE HYDROCHLORIDE 10 MG: 20 INJECTION, SOLUTION EPIDURAL; INFILTRATION; INTRACAUDAL; PERINEURAL at 13:54

## 2019-06-10 RX ADMIN — PROPOFOL 20 MG: 10 INJECTION, EMULSION INTRAVENOUS at 13:43

## 2019-06-10 RX ADMIN — LIDOCAINE HYDROCHLORIDE 10 MG: 20 INJECTION, SOLUTION EPIDURAL; INFILTRATION; INTRACAUDAL; PERINEURAL at 13:43

## 2019-06-10 RX ADMIN — PROPOFOL 60 MG: 10 INJECTION, EMULSION INTRAVENOUS at 13:35

## 2019-06-10 RX ADMIN — PROPOFOL 20 MG: 10 INJECTION, EMULSION INTRAVENOUS at 13:38

## 2019-06-10 RX ADMIN — LIDOCAINE HYDROCHLORIDE 10 MG: 20 INJECTION, SOLUTION EPIDURAL; INFILTRATION; INTRACAUDAL; PERINEURAL at 13:46

## 2019-06-10 RX ADMIN — PROPOFOL 20 MG: 10 INJECTION, EMULSION INTRAVENOUS at 13:54

## 2019-06-10 RX ADMIN — PROPOFOL 20 MG: 10 INJECTION, EMULSION INTRAVENOUS at 13:46

## 2019-06-10 RX ADMIN — LIDOCAINE HYDROCHLORIDE 10 MG: 20 INJECTION, SOLUTION EPIDURAL; INFILTRATION; INTRACAUDAL; PERINEURAL at 13:38

## 2019-06-10 RX ADMIN — LIDOCAINE HYDROCHLORIDE 10 MG: 20 INJECTION, SOLUTION EPIDURAL; INFILTRATION; INTRACAUDAL; PERINEURAL at 13:49

## 2019-06-10 RX ADMIN — SODIUM CHLORIDE: 0.9 INJECTION, SOLUTION INTRAVENOUS at 12:51

## 2019-06-10 RX ADMIN — LIDOCAINE HYDROCHLORIDE 30 MG: 20 INJECTION, SOLUTION EPIDURAL; INFILTRATION; INTRACAUDAL; PERINEURAL at 13:35

## 2019-06-10 RX ADMIN — LIDOCAINE HYDROCHLORIDE 10 MG: 20 INJECTION, SOLUTION EPIDURAL; INFILTRATION; INTRACAUDAL; PERINEURAL at 13:40

## 2019-06-10 RX ADMIN — PROPOFOL 20 MG: 10 INJECTION, EMULSION INTRAVENOUS at 13:52

## 2019-06-10 RX ADMIN — PROPOFOL 20 MG: 10 INJECTION, EMULSION INTRAVENOUS at 13:40

## 2019-06-10 ASSESSMENT — ENCOUNTER SYMPTOMS: SHORTNESS OF BREATH: 0

## 2019-06-10 ASSESSMENT — LIFESTYLE VARIABLES: SMOKING_STATUS: 0

## 2019-06-10 ASSESSMENT — PAIN - FUNCTIONAL ASSESSMENT: PAIN_FUNCTIONAL_ASSESSMENT: 0-10

## 2019-06-10 ASSESSMENT — PAIN SCALES - GENERAL
PAINLEVEL_OUTOF10: 0

## 2019-06-10 NOTE — ANESTHESIA PRE PROCEDURE
Department of Anesthesiology  Preprocedure Note       Name:  Alondra Goddard   Age:  80 y.o.  :  1932                                          MRN:  [id number]         Date:  6/10/2019      Surgeon: Mini Arenas):  Celsa Bacon MD    Procedure: COLONOSCOPY DIAGNOSTIC (N/A )    Medications prior to admission:   Prior to Admission medications    Medication Sig Start Date End Date Taking? Authorizing Provider   Multiple Vitamins-Minerals (THERAPEUTIC MULTIVITAMIN-MINERALS) tablet Take 1 tablet by mouth daily   Yes Historical Provider, MD   NIFEdipine (ADALAT CC) 30 MG extended release tablet Take 1 tablet by mouth daily 19  Yes Ace Pastrana MD   PROFE 391.3 (180 Fe) MG CAPS TAKE ONE CAPSULE BY MOUTH DAILY 19  Yes Ace Pastrana MD   tamsulosin (FLOMAX) 0.4 MG capsule Take 0.4 mg by mouth daily   Yes Historical Provider, MD   pravastatin (PRAVACHOL) 20 MG tablet Take 1 tablet by mouth daily 19  Yes Ace Pastrana MD   omeprazole (PRILOSEC) 40 MG delayed release capsule Take 1 capsule by mouth daily as needed (GERD) 19  Yes Ace Pastrana MD   metFORMIN (GLUCOPHAGE) 500 MG tablet Take 1 tablet by mouth daily (with breakfast) 19  Yes Ace Pastrana MD   budesonide-formoterol (SYMBICORT) 160-4.5 MCG/ACT AERO Inhale 2 puffs into the lungs daily 19  Yes Ace Pastrana MD   aspirin 81 MG EC tablet Take 1 tablet by mouth daily 18  Yes Ace Pastrana MD   clotrimazole-betamethasone (LOTRISONE) 1-0.05 % cream Apply topically 2 times daily.  18  Yes Ace Pastrana MD   montelukast (SINGULAIR) 10 MG tablet Take 1 tablet by mouth daily 18  Yes Ace Pastrana MD   fexofenadine (ALLEGRA) 180 MG tablet Take 1 tablet by mouth daily 18  Yes Ace Pastrana MD   fluticasone (FLONASE) 50 MCG/ACT nasal spray 1 spray by Nasal route daily 18  Yes Ace Pastrana MD       Current medications:    Current Facility-Administered Medications   Medication Dose Route Frequency Provider Last Rate Last Dose    0.9 % sodium chloride infusion   Intravenous Continuous Ludy Thacker  mL/hr at 06/10/19 1251      sodium chloride flush 0.9 % injection 10 mL  10 mL Intravenous 2 times per day Ludy Thacker MD        sodium chloride flush 0.9 % injection 10 mL  10 mL Intravenous PRN Ludy Thacker MD           Allergies:  No Known Allergies    Problem List:    Patient Active Problem List   Diagnosis Code    Type II or unspecified type diabetes mellitus without mention of complication, not stated as uncontrolled E11.9    Degenerative arthritis of left knee M17.12    Degenerative arthritis of right knee M17.11    Knee pain M25.569       Past Medical History:        Diagnosis Date    Hyperlipidemia     Hypertension     Type II or unspecified type diabetes mellitus without mention of complication, not stated as uncontrolled        Past Surgical History:        Procedure Laterality Date    COLONOSCOPY      KNEE ARTHROSCOPY         Social History:    Social History     Tobacco Use    Smoking status: Never Smoker    Smokeless tobacco: Never Used   Substance Use Topics    Alcohol use: Yes     Comment: occas                                Counseling given: Not Answered      Vital Signs (Current):   Vitals:    06/04/19 0952 06/10/19 1240   BP:  (!) 159/60   Pulse:  62   Resp:  18   Temp:  98 °F (36.7 °C)   TempSrc:  Temporal   SpO2:  100%   Weight: 200 lb (90.7 kg) 191 lb 4 oz (86.8 kg)   Height: 6' 1\" (1.854 m) 6' 1\" (1.854 m)                                              BP Readings from Last 3 Encounters:   06/10/19 (!) 159/60   05/20/19 (!) 90/50   04/25/19 130/68       NPO Status: Time of last liquid consumption: 0800                        Time of last solid consumption: 1700                        Date of last liquid consumption: 06/10/19                        Date of last solid food consumption: 06/08/19    BMI:   Wt Readings from Last 3 Encounters:   06/10/19 191 lb 4 oz (86.8 kg)   05/20/19 190 lb 6.4 oz (86.4 kg)   04/25/19 189 lb (85.7 kg)     Body mass index is 25.23 kg/m². CBC:   Lab Results   Component Value Date    WBC 3.6 04/25/2019    RBC 3.92 04/25/2019    HGB 12.9 04/25/2019    HCT 37.8 04/25/2019    MCV 96.3 04/25/2019    RDW 13.5 04/25/2019     04/25/2019       CMP:   Lab Results   Component Value Date     04/25/2019    K 4.1 04/25/2019     04/25/2019    CO2 25 04/25/2019    BUN 10 04/25/2019    CREATININE 1.1 04/25/2019    GFRAA >60 04/25/2019    GFRAA >60 12/28/2012    AGRATIO 1.3 04/25/2019    LABGLOM >60 04/25/2019    GLUCOSE 104 04/25/2019    PROT 7.7 04/25/2019    PROT 7.7 12/28/2012    CALCIUM 9.8 04/25/2019    BILITOT 0.5 04/25/2019    ALKPHOS 73 04/25/2019    AST 25 04/25/2019    ALT 30 04/25/2019       POC Tests:   Recent Labs     06/10/19  1246   POCGLU 85       Coags: No results found for: PROTIME, INR, APTT    HCG (If Applicable): No results found for: PREGTESTUR, PREGSERUM, HCG, HCGQUANT     ABGs: No results found for: PHART, PO2ART, ZUE3GQV, DON0SRN, BEART, K3SIKVON     Type & Screen (If Applicable):  No results found for: LABABO, [address]    Anesthesia Evaluation  Patient summary reviewed and Nursing notes reviewed no history of anesthetic complications:   Airway: Mallampati: II  TM distance: >3 FB     Mouth opening: > = 3 FB Dental:    (+) lower dentures and upper dentures      Pulmonary:       (-) pneumonia, COPD, asthma, shortness of breath, recent URI, sleep apnea and not a current smoker                          ROS comment: Seasonal allergies   Cardiovascular:    (+) hypertension:,     (-) pacemaker, valvular problems/murmurs, past MI, CAD, CABG/stent, dysrhythmias,  angina,  CHF, orthopnea and PND                Neuro/Psych:   Negative Neuro/Psych ROS              GI/Hepatic/Renal:   (+) GERD:,           Endo/Other:    (+) DiabetesType II DM, , malignancy/cancer (h/o prostate cancer).                  Abdominal: Vascular: negative vascular ROS. Anesthesia Plan      MAC     ASA 3       Induction: intravenous. Anesthetic plan and risks discussed with patient. Plan discussed with CRNA. Priyanka Nascimento MD   6/10/2019        This pre-anesthesia assessment may be used as a history and physical.    DOS STAFF ADDENDUM:    Pt seen and examined, chart reviewed (including anesthesia, drug and allergy history). No interval changes to history and physical examination. Anesthetic plan, risks, benefits, alternatives, and personnel involved discussed with patient. Patient verbalized an understanding and agrees to proceed.       Priyanka Nascimento MD  Jackeline 10, 2019  1:21 PM

## 2019-06-10 NOTE — PROCEDURES
[address]                               COLONOSCOPY REPORT    PATIENT NAME: Bandar Lai                    :        1932  MED REC NO:   [id number]                          ROOM:  ACCOUNT NO:   [de-identified]                           ADMIT DATE: 06/10/2019  PROVIDER:     Silverio Mansfield MD    DATE OF PROCEDURE:  06/10/2019    REFERRING PROVIDER:  Manda Smith MD    PATIENT HISTORY:  This is an 69-year-old male, outpatient. INSTRUMENT USED:  Olympus PCF-Q180AL. MEDICATIONS OF PROCEDURE:  The patient was premedicated with Diprivan  intravenously as administered by the anesthesiology service. INDICATIONS:  The patient has a history of colonic polyps. He also has  a history of prostate cancer. DESCRIPTION OF PROCEDURE:  The digital and anal exams were normal.  The  colonoscope was inserted to the cecum. The prep overall was good. There was incidental sigmoid diverticulosis. Two sessile diminutive  polyps were identified, one in the transverse colon and one in the  descending colon. Both were removed via cold biopsy technique. Asymptomatic distal radiation proctitis was noted on retroflexion in the  rectum. IMPRESSION:  1. Colonic polyps, removed, path pending. 2.  Distal radiation proctitis. 3.  Sigmoid diverticulosis. PLAN:  The patient will call the office for biopsy results. Due to age,  he will not require surveillance colonoscopies in the future.         Shanna Mccoy MD    D: 06/10/2019 14:15:44       T: 06/10/2019 15:19:06     [id number]  Job#: [id number]     Doc#: [id number]    CC:  Silverio Mansfield MD

## 2019-06-10 NOTE — H&P
Yes Holly Horton MD   fluticasone [facility]) 50 MCG/ACT nasal spray 1 spray by Nasal route daily 9/4/18  Yes Holly Horton MD     Allergies:    Patient has no known allergies. Social History:   Social History     Socioeconomic History    Marital status:      Spouse name: Not on file    Number of children: Not on file    Years of education: Not on file    Highest education level: Not on file   Occupational History    Not on file   Social Needs    Financial resource strain: Not on file    Food insecurity:     Worry: Not on file     Inability: Not on file    Transportation needs:     Medical: Not on file     Non-medical: Not on file   Tobacco Use    Smoking status: Never Smoker    Smokeless tobacco: Never Used   Substance and Sexual Activity    Alcohol use: Yes     Comment: occas    Drug use: No    Sexual activity: Not on file   Lifestyle    Physical activity:     Days per week: Not on file     Minutes per session: Not on file    Stress: Not on file   Relationships    Social connections:     Talks on phone: Not on file     Gets together: Not on file     Attends Hoahaoism service: Not on file     Active member of club or organization: Not on file     Attends meetings of clubs or organizations: Not on file     Relationship status: Not on file    Intimate partner violence:     Fear of current or ex partner: Not on file     Emotionally abused: Not on file     Physically abused: Not on file     Forced sexual activity: Not on file   Other Topics Concern    Not on file   Social History Narrative    Not on file           Family History:   History reviewed. No pertinent family history.       PHYSICAL EXAM:      BP (!) 159/60   Pulse 62   Temp 98 °F (36.7 °C) (Temporal)   Resp 18   Ht 6' 1\" (1.854 m)   Wt 191 lb 4 oz (86.8 kg)   SpO2 100%   BMI 25.23 kg/m²  I        Heart: Normal    Lungs: Normal    Abdomen: Normal      ASA Grade: ASA 3 - Patient with moderate systemic disease with

## 2019-09-28 ENCOUNTER — HOSPITAL ENCOUNTER (EMERGENCY)
Age: 84
Discharge: HOME | End: 2019-09-28
Attending: EMERGENCY MEDICINE
Payer: MEDICARE

## 2019-09-28 VITALS
DIASTOLIC BLOOD PRESSURE: 43 MMHG | HEART RATE: 60 BPM | SYSTOLIC BLOOD PRESSURE: 134 MMHG | RESPIRATION RATE: 16 BRPM | TEMPERATURE: 97.3 F | OXYGEN SATURATION: 100 %

## 2019-09-28 DIAGNOSIS — R31.9 HEMATURIA, UNSPECIFIED TYPE: Primary | ICD-10-CM

## 2019-09-28 LAB
BILIRUBIN URINE: NEGATIVE
BLOOD, URINE: ABNORMAL
CLARITY: CLEAR
COLOR: YELLOW
GLUCOSE BLD-MCNC: 115 MG/DL (ref 70–99)
GLUCOSE URINE: NEGATIVE MG/DL
KETONES, URINE: NEGATIVE MG/DL
LEUKOCYTE ESTERASE, URINE: NEGATIVE
MICROSCOPIC EXAMINATION: YES
NITRITE, URINE: NEGATIVE
PERFORMED ON: ABNORMAL
PH UA: 6 (ref 5–8)
PROTEIN UA: ABNORMAL MG/DL
RBC UA: ABNORMAL /HPF (ref 0–2)
SPECIFIC GRAVITY UA: 1.01 (ref 1–1.03)
URINE TYPE: ABNORMAL
UROBILINOGEN, URINE: 0.2 E.U./DL
WBC UA: ABNORMAL /HPF (ref 0–5)

## 2019-09-28 PROCEDURE — 87086 URINE CULTURE/COLONY COUNT: CPT

## 2019-09-28 PROCEDURE — 6370000000 HC RX 637 (ALT 250 FOR IP): Performed by: EMERGENCY MEDICINE

## 2019-09-28 PROCEDURE — 99283 EMERGENCY DEPT VISIT LOW MDM: CPT

## 2019-09-28 PROCEDURE — 51798 US URINE CAPACITY MEASURE: CPT

## 2019-09-28 PROCEDURE — 81001 URINALYSIS AUTO W/SCOPE: CPT

## 2019-09-28 RX ORDER — CEPHALEXIN 500 MG/1
500 CAPSULE ORAL ONCE
Status: COMPLETED | OUTPATIENT
Start: 2019-09-28 | End: 2019-09-28

## 2019-09-28 RX ORDER — CEPHALEXIN 500 MG/1
500 CAPSULE ORAL 3 TIMES DAILY
Qty: 15 CAPSULE | Refills: 0 | Status: SHIPPED | OUTPATIENT
Start: 2019-09-28 | End: 2019-10-03

## 2019-09-28 RX ADMIN — CEPHALEXIN 500 MG: 500 CAPSULE ORAL at 21:54

## 2019-09-28 ASSESSMENT — ENCOUNTER SYMPTOMS
RESPIRATORY NEGATIVE: 1
EYES NEGATIVE: 1
GASTROINTESTINAL NEGATIVE: 1

## 2019-09-30 LAB — URINE CULTURE, ROUTINE: NORMAL

## 2019-10-02 ENCOUNTER — TELEPHONE (OUTPATIENT)
Dept: PRIMARY CARE CLINIC | Age: 84
End: 2019-10-02

## 2019-10-03 ENCOUNTER — OFFICE VISIT (OUTPATIENT)
Dept: PRIMARY CARE CLINIC | Age: 84
End: 2019-10-03
Payer: MEDICARE

## 2019-10-03 VITALS
WEIGHT: 200 LBS | SYSTOLIC BLOOD PRESSURE: 110 MMHG | BODY MASS INDEX: 26.51 KG/M2 | TEMPERATURE: 98.2 F | OXYGEN SATURATION: 97 % | DIASTOLIC BLOOD PRESSURE: 70 MMHG | HEART RATE: 88 BPM | HEIGHT: 73 IN

## 2019-10-03 DIAGNOSIS — D50.8 OTHER IRON DEFICIENCY ANEMIA: ICD-10-CM

## 2019-10-03 DIAGNOSIS — Z23 NEED FOR PNEUMOCOCCAL VACCINATION: ICD-10-CM

## 2019-10-03 DIAGNOSIS — Z23 NEEDS FLU SHOT: ICD-10-CM

## 2019-10-03 DIAGNOSIS — Z91.81 AT HIGH RISK FOR FALLS: Primary | ICD-10-CM

## 2019-10-03 DIAGNOSIS — I10 ESSENTIAL HYPERTENSION: ICD-10-CM

## 2019-10-03 DIAGNOSIS — Z09 HOSPITAL DISCHARGE FOLLOW-UP: ICD-10-CM

## 2019-10-03 DIAGNOSIS — E11.9 TYPE 2 DIABETES MELLITUS WITHOUT COMPLICATION, WITHOUT LONG-TERM CURRENT USE OF INSULIN (HCC): ICD-10-CM

## 2019-10-03 DIAGNOSIS — E78.2 MIXED HYPERLIPIDEMIA: ICD-10-CM

## 2019-10-03 LAB
BASOPHILS ABSOLUTE: 0 K/UL (ref 0–0.2)
BASOPHILS RELATIVE PERCENT: 1.2 %
BILIRUBIN URINE: NEGATIVE
BLOOD, URINE: NEGATIVE
CLARITY: CLEAR
COLOR: ABNORMAL
CRYSTALS, UA: ABNORMAL /HPF
EOSINOPHILS ABSOLUTE: 0.3 K/UL (ref 0–0.6)
EOSINOPHILS RELATIVE PERCENT: 9.2 %
EPITHELIAL CELLS, UA: 1 /HPF (ref 0–5)
GLUCOSE URINE: NEGATIVE MG/DL
HCT VFR BLD CALC: 35.6 % (ref 40.5–52.5)
HEMOGLOBIN: 12.2 G/DL (ref 13.5–17.5)
HYALINE CASTS: 2 /LPF (ref 0–8)
KETONES, URINE: ABNORMAL MG/DL
LEUKOCYTE ESTERASE, URINE: NEGATIVE
LYMPHOCYTES ABSOLUTE: 1.1 K/UL (ref 1–5.1)
LYMPHOCYTES RELATIVE PERCENT: 32.7 %
MCH RBC QN AUTO: 32.6 PG (ref 26–34)
MCHC RBC AUTO-ENTMCNC: 34.3 G/DL (ref 31–36)
MCV RBC AUTO: 95 FL (ref 80–100)
MICROSCOPIC EXAMINATION: YES
MONOCYTES ABSOLUTE: 0.3 K/UL (ref 0–1.3)
MONOCYTES RELATIVE PERCENT: 9.1 %
NEUTROPHILS ABSOLUTE: 1.6 K/UL (ref 1.7–7.7)
NEUTROPHILS RELATIVE PERCENT: 47.8 %
NITRITE, URINE: NEGATIVE
PDW BLD-RTO: 13.5 % (ref 12.4–15.4)
PH UA: 6 (ref 5–8)
PLATELET # BLD: 272 K/UL (ref 135–450)
PMV BLD AUTO: 8 FL (ref 5–10.5)
PROTEIN UA: 30 MG/DL
RBC # BLD: 3.74 M/UL (ref 4.2–5.9)
RBC UA: 4 /HPF (ref 0–4)
SPECIFIC GRAVITY UA: 1.03 (ref 1–1.03)
URINE TYPE: ABNORMAL
UROBILINOGEN, URINE: 0.2 E.U./DL
WBC # BLD: 3.3 K/UL (ref 4–11)
WBC UA: 2 /HPF (ref 0–5)

## 2019-10-03 PROCEDURE — G8419 CALC BMI OUT NRM PARAM NOF/U: HCPCS | Performed by: INTERNAL MEDICINE

## 2019-10-03 PROCEDURE — 1123F ACP DISCUSS/DSCN MKR DOCD: CPT | Performed by: INTERNAL MEDICINE

## 2019-10-03 PROCEDURE — 4040F PNEUMOC VAC/ADMIN/RCVD: CPT | Performed by: INTERNAL MEDICINE

## 2019-10-03 PROCEDURE — 90653 IIV ADJUVANT VACCINE IM: CPT | Performed by: INTERNAL MEDICINE

## 2019-10-03 PROCEDURE — G8427 DOCREV CUR MEDS BY ELIG CLIN: HCPCS | Performed by: INTERNAL MEDICINE

## 2019-10-03 PROCEDURE — G0008 ADMIN INFLUENZA VIRUS VAC: HCPCS | Performed by: INTERNAL MEDICINE

## 2019-10-03 PROCEDURE — 1036F TOBACCO NON-USER: CPT | Performed by: INTERNAL MEDICINE

## 2019-10-03 PROCEDURE — 1111F DSCHRG MED/CURRENT MED MERGE: CPT | Performed by: INTERNAL MEDICINE

## 2019-10-03 PROCEDURE — 99214 OFFICE O/P EST MOD 30 MIN: CPT | Performed by: INTERNAL MEDICINE

## 2019-10-03 PROCEDURE — G8482 FLU IMMUNIZE ORDER/ADMIN: HCPCS | Performed by: INTERNAL MEDICINE

## 2019-10-03 RX ORDER — PRAVASTATIN SODIUM 20 MG
20 TABLET ORAL DAILY
Qty: 30 TABLET | Refills: 6 | Status: SHIPPED | OUTPATIENT
Start: 2019-10-03 | End: 2020-04-29 | Stop reason: SDUPTHER

## 2019-10-03 RX ORDER — TAMSULOSIN HYDROCHLORIDE 0.4 MG/1
0.4 CAPSULE ORAL DAILY
Qty: 90 CAPSULE | Refills: 3 | Status: SHIPPED | OUTPATIENT
Start: 2019-10-03 | End: 2020-04-27 | Stop reason: SDUPTHER

## 2019-10-03 RX ORDER — NIFEDIPINE 30 MG/1
30 TABLET, FILM COATED, EXTENDED RELEASE ORAL DAILY
Qty: 30 TABLET | Refills: 5 | Status: SHIPPED | OUTPATIENT
Start: 2019-10-03 | End: 2020-04-29 | Stop reason: SDUPTHER

## 2019-10-03 ASSESSMENT — ENCOUNTER SYMPTOMS
SHORTNESS OF BREATH: 0
CHEST TIGHTNESS: 0
EYES NEGATIVE: 1
WHEEZING: 0
DIARRHEA: 0
BLOOD IN STOOL: 0
CONSTIPATION: 0
GASTROINTESTINAL NEGATIVE: 1
COUGH: 0

## 2019-10-04 LAB
ESTIMATED AVERAGE GLUCOSE: 116.9 MG/DL
HBA1C MFR BLD: 5.7 %

## 2019-10-28 DIAGNOSIS — D50.8 OTHER IRON DEFICIENCY ANEMIA: ICD-10-CM

## 2019-10-29 RX ORDER — IRON POLYSACCHARIDE COMPLEX 180 MG
CAPSULE ORAL
Qty: 30 CAPSULE | Refills: 4 | Status: SHIPPED | OUTPATIENT
Start: 2019-10-29 | End: 2020-04-06

## 2019-12-21 DIAGNOSIS — K21.9 GASTROESOPHAGEAL REFLUX DISEASE WITHOUT ESOPHAGITIS: ICD-10-CM

## 2019-12-21 DIAGNOSIS — D50.8 OTHER IRON DEFICIENCY ANEMIA: ICD-10-CM

## 2019-12-23 RX ORDER — OMEPRAZOLE 40 MG/1
CAPSULE, DELAYED RELEASE ORAL
Qty: 30 CAPSULE | Refills: 5 | Status: SHIPPED | OUTPATIENT
Start: 2019-12-23 | End: 2020-04-29 | Stop reason: SDUPTHER

## 2020-04-06 RX ORDER — IRON POLYSACCHARIDE COMPLEX 180 MG
CAPSULE ORAL
Qty: 30 CAPSULE | Refills: 3 | Status: SHIPPED | OUTPATIENT
Start: 2020-04-06 | End: 2020-08-17

## 2020-04-27 RX ORDER — TAMSULOSIN HYDROCHLORIDE 0.4 MG/1
0.4 CAPSULE ORAL DAILY
Qty: 90 CAPSULE | Refills: 3 | Status: SHIPPED | OUTPATIENT
Start: 2020-04-27 | End: 2020-05-04 | Stop reason: SDUPTHER

## 2020-04-29 ENCOUNTER — TELEPHONE (OUTPATIENT)
Dept: PRIMARY CARE CLINIC | Age: 85
End: 2020-04-29

## 2020-04-29 RX ORDER — OMEPRAZOLE 40 MG/1
40 CAPSULE, DELAYED RELEASE ORAL DAILY
Qty: 90 CAPSULE | Refills: 1 | Status: SHIPPED | OUTPATIENT
Start: 2020-04-29 | End: 2020-09-29 | Stop reason: SDUPTHER

## 2020-04-29 RX ORDER — PRAVASTATIN SODIUM 20 MG
20 TABLET ORAL DAILY
Qty: 90 TABLET | Refills: 1 | Status: SHIPPED | OUTPATIENT
Start: 2020-04-29 | End: 2020-09-29 | Stop reason: SDUPTHER

## 2020-04-29 RX ORDER — NIFEDIPINE 30 MG/1
30 TABLET, FILM COATED, EXTENDED RELEASE ORAL DAILY
Qty: 90 TABLET | Refills: 1 | Status: SHIPPED | OUTPATIENT
Start: 2020-04-29 | End: 2020-09-29

## 2020-05-04 RX ORDER — TAMSULOSIN HYDROCHLORIDE 0.4 MG/1
0.4 CAPSULE ORAL DAILY
Qty: 90 CAPSULE | Refills: 3 | Status: SHIPPED | OUTPATIENT
Start: 2020-05-04 | End: 2020-08-17 | Stop reason: ALTCHOICE

## 2020-08-17 ENCOUNTER — TELEPHONE (OUTPATIENT)
Dept: PRIMARY CARE CLINIC | Age: 85
End: 2020-08-17

## 2020-08-17 RX ORDER — FERROUS SULFATE 220 (44)/5
220 ELIXIR ORAL DAILY
COMMUNITY
End: 2021-03-18 | Stop reason: SDUPTHER

## 2020-09-29 ENCOUNTER — OFFICE VISIT (OUTPATIENT)
Dept: PRIMARY CARE CLINIC | Age: 85
End: 2020-09-29
Payer: MEDICARE

## 2020-09-29 VITALS
HEIGHT: 73 IN | WEIGHT: 209 LBS | BODY MASS INDEX: 27.7 KG/M2 | HEART RATE: 78 BPM | SYSTOLIC BLOOD PRESSURE: 140 MMHG | TEMPERATURE: 97.7 F | DIASTOLIC BLOOD PRESSURE: 70 MMHG

## 2020-09-29 PROCEDURE — 90694 VACC AIIV4 NO PRSRV 0.5ML IM: CPT | Performed by: INTERNAL MEDICINE

## 2020-09-29 PROCEDURE — 1036F TOBACCO NON-USER: CPT | Performed by: INTERNAL MEDICINE

## 2020-09-29 PROCEDURE — 4040F PNEUMOC VAC/ADMIN/RCVD: CPT | Performed by: INTERNAL MEDICINE

## 2020-09-29 PROCEDURE — G8427 DOCREV CUR MEDS BY ELIG CLIN: HCPCS | Performed by: INTERNAL MEDICINE

## 2020-09-29 PROCEDURE — 99214 OFFICE O/P EST MOD 30 MIN: CPT | Performed by: INTERNAL MEDICINE

## 2020-09-29 PROCEDURE — G8417 CALC BMI ABV UP PARAM F/U: HCPCS | Performed by: INTERNAL MEDICINE

## 2020-09-29 PROCEDURE — G0008 ADMIN INFLUENZA VIRUS VAC: HCPCS | Performed by: INTERNAL MEDICINE

## 2020-09-29 PROCEDURE — 1123F ACP DISCUSS/DSCN MKR DOCD: CPT | Performed by: INTERNAL MEDICINE

## 2020-09-29 RX ORDER — ACETAMINOPHEN 500 MG
500 TABLET ORAL EVERY 6 HOURS PRN
Qty: 120 TABLET | Refills: 1 | Status: SHIPPED | OUTPATIENT
Start: 2020-09-29 | End: 2021-03-18 | Stop reason: SDUPTHER

## 2020-09-29 RX ORDER — PRAVASTATIN SODIUM 20 MG
20 TABLET ORAL DAILY
Qty: 90 TABLET | Refills: 5 | Status: SHIPPED | OUTPATIENT
Start: 2020-09-29 | End: 2021-03-18 | Stop reason: SDUPTHER

## 2020-09-29 RX ORDER — OMEPRAZOLE 40 MG/1
40 CAPSULE, DELAYED RELEASE ORAL DAILY
Qty: 90 CAPSULE | Refills: 1 | Status: SHIPPED | OUTPATIENT
Start: 2020-09-29 | End: 2021-03-18 | Stop reason: SDUPTHER

## 2020-09-29 RX ORDER — NIFEDIPINE 30 MG/1
30 TABLET, FILM COATED, EXTENDED RELEASE ORAL DAILY
Qty: 90 TABLET | Refills: 5 | Status: SHIPPED | OUTPATIENT
Start: 2020-09-29 | End: 2021-03-18 | Stop reason: SDUPTHER

## 2020-09-29 ASSESSMENT — ENCOUNTER SYMPTOMS
CONSTIPATION: 0
WHEEZING: 0
CHEST TIGHTNESS: 0
EYES NEGATIVE: 1
BLOOD IN STOOL: 0
DIARRHEA: 0
COUGH: 0
GASTROINTESTINAL NEGATIVE: 1
SHORTNESS OF BREATH: 0

## 2020-09-29 ASSESSMENT — PATIENT HEALTH QUESTIONNAIRE - PHQ9
SUM OF ALL RESPONSES TO PHQ9 QUESTIONS 1 & 2: 0
2. FEELING DOWN, DEPRESSED OR HOPELESS: 0
SUM OF ALL RESPONSES TO PHQ QUESTIONS 1-9: 0
1. LITTLE INTEREST OR PLEASURE IN DOING THINGS: 0
SUM OF ALL RESPONSES TO PHQ QUESTIONS 1-9: 0

## 2020-09-29 NOTE — PROGRESS NOTES
decreased range of motion and pain. He exhibits no tenderness. Left shoulder: He exhibits decreased range of motion and pain. He exhibits no tenderness. Neurological: He is alert and oriented to person, place, and time. He has normal strength. Skin: Skin is warm and dry. Psychiatric: He has a normal mood and affect. His behavior is normal. Cognition and memory are normal.   Vitals reviewed. Assessment:      Rachel Fernandez was seen today for check-up. Diagnoses and all orders for this visit:    Type 2 diabetes mellitus without complication, without long-term current use of insulin (HCC)  -     Amb External Referral To Ophthalmology  -     NIFEdipine (ADALAT CC) 30 MG extended release tablet; Take 1 tablet by mouth daily  -     pravastatin (PRAVACHOL) 20 MG tablet; Take 1 tablet by mouth daily  -     CBC Auto Differential; Future  -     Comprehensive Metabolic Panel; Future  -     Hemoglobin A1C; Future  -     Lipid Panel; Future  -     TSH without Reflex; Future  -     Urinalysis; Future  -     Vitamin D 25 Hydroxy; Future    Essential hypertension  -     NIFEdipine (ADALAT CC) 30 MG extended release tablet; Take 1 tablet by mouth daily    Mixed hyperlipidemia  -     pravastatin (PRAVACHOL) 20 MG tablet; Take 1 tablet by mouth daily    Gastroesophageal reflux disease without esophagitis  -     omeprazole (PRILOSEC) 40 MG delayed release capsule; Take 1 capsule by mouth daily    Other iron deficiency anemia  -     omeprazole (PRILOSEC) 40 MG delayed release capsule; Take 1 capsule by mouth daily    Localized osteoarthritis of both shoulders  Possibly aggravated by gait and leaning on cane . Refuses PT . Discussed some rotatory exercises  -     acetaminophen (TYLENOL) 500 MG tablet; Take 1 tablet by mouth every 6 hours as needed for Pain    Need for influenza vaccination  -     INFLUENZA, QUADV, ADJUVANTED, 65 YRS =, IM, PF, PREFILL SYR, 0.5ML (FLUAD)              CA of prostate [facility]) c/o Urology .  Off

## 2020-09-30 DIAGNOSIS — E11.9 TYPE 2 DIABETES MELLITUS WITHOUT COMPLICATION, WITHOUT LONG-TERM CURRENT USE OF INSULIN (HCC): ICD-10-CM

## 2020-09-30 LAB
A/G RATIO: 1.4 (ref 1.1–2.2)
ALBUMIN SERPL-MCNC: 4.2 G/DL (ref 3.4–5)
ALP BLD-CCNC: 61 U/L (ref 40–129)
ALT SERPL-CCNC: 15 U/L (ref 10–40)
ANION GAP SERPL CALCULATED.3IONS-SCNC: 14 MMOL/L (ref 3–16)
AST SERPL-CCNC: 22 U/L (ref 15–37)
BASOPHILS ABSOLUTE: 0 K/UL (ref 0–0.2)
BASOPHILS RELATIVE PERCENT: 0.7 %
BILIRUB SERPL-MCNC: 0.6 MG/DL (ref 0–1)
BUN BLDV-MCNC: 9 MG/DL (ref 7–20)
CALCIUM SERPL-MCNC: 9.7 MG/DL (ref 8.3–10.6)
CHLORIDE BLD-SCNC: 104 MMOL/L (ref 99–110)
CHOLESTEROL, TOTAL: 138 MG/DL (ref 0–199)
CO2: 23 MMOL/L (ref 21–32)
CREAT SERPL-MCNC: 0.9 MG/DL (ref 0.8–1.3)
EOSINOPHILS ABSOLUTE: 0.3 K/UL (ref 0–0.6)
EOSINOPHILS RELATIVE PERCENT: 7.2 %
GFR AFRICAN AMERICAN: >60
GFR NON-AFRICAN AMERICAN: >60
GLOBULIN: 3.1 G/DL
GLUCOSE BLD-MCNC: 145 MG/DL (ref 70–99)
HCT VFR BLD CALC: 38.8 % (ref 40.5–52.5)
HDLC SERPL-MCNC: 38 MG/DL (ref 40–60)
HEMOGLOBIN: 13.2 G/DL (ref 13.5–17.5)
LDL CHOLESTEROL CALCULATED: 72 MG/DL
LYMPHOCYTES ABSOLUTE: 1.5 K/UL (ref 1–5.1)
LYMPHOCYTES RELATIVE PERCENT: 40 %
MCH RBC QN AUTO: 32.4 PG (ref 26–34)
MCHC RBC AUTO-ENTMCNC: 34 G/DL (ref 31–36)
MCV RBC AUTO: 95.4 FL (ref 80–100)
MONOCYTES ABSOLUTE: 0.4 K/UL (ref 0–1.3)
MONOCYTES RELATIVE PERCENT: 11.4 %
NEUTROPHILS ABSOLUTE: 1.5 K/UL (ref 1.7–7.7)
NEUTROPHILS RELATIVE PERCENT: 40.7 %
PDW BLD-RTO: 13.7 % (ref 12.4–15.4)
PLATELET # BLD: 239 K/UL (ref 135–450)
PMV BLD AUTO: 8.7 FL (ref 5–10.5)
POTASSIUM SERPL-SCNC: 3.9 MMOL/L (ref 3.5–5.1)
RBC # BLD: 4.06 M/UL (ref 4.2–5.9)
SODIUM BLD-SCNC: 141 MMOL/L (ref 136–145)
TOTAL PROTEIN: 7.3 G/DL (ref 6.4–8.2)
TRIGL SERPL-MCNC: 140 MG/DL (ref 0–150)
TSH SERPL DL<=0.05 MIU/L-ACNC: 2.66 UIU/ML (ref 0.27–4.2)
VITAMIN D 25-HYDROXY: 27.6 NG/ML
VLDLC SERPL CALC-MCNC: 28 MG/DL
WBC # BLD: 3.7 K/UL (ref 4–11)

## 2020-10-01 LAB
ESTIMATED AVERAGE GLUCOSE: 119.8 MG/DL
HBA1C MFR BLD: 5.8 %

## 2020-11-18 ENCOUNTER — TELEPHONE (OUTPATIENT)
Dept: PRIMARY CARE CLINIC | Age: 85
End: 2020-11-18

## 2020-11-18 NOTE — TELEPHONE ENCOUNTER
Patient like refill on medication Nifedipine 30 mg, Pravastatin 20 mg & omeprazole 40 mg 90 day supply, please send to [address] in [facility]

## 2021-03-18 ENCOUNTER — OFFICE VISIT (OUTPATIENT)
Dept: PRIMARY CARE CLINIC | Age: 86
End: 2021-03-18
Payer: MEDICARE

## 2021-03-18 ENCOUNTER — TELEPHONE (OUTPATIENT)
Dept: PRIMARY CARE CLINIC | Age: 86
End: 2021-03-18

## 2021-03-18 VITALS
HEIGHT: 73 IN | OXYGEN SATURATION: 99 % | DIASTOLIC BLOOD PRESSURE: 80 MMHG | WEIGHT: 205 LBS | BODY MASS INDEX: 27.17 KG/M2 | SYSTOLIC BLOOD PRESSURE: 140 MMHG | TEMPERATURE: 96.8 F | HEART RATE: 65 BPM

## 2021-03-18 DIAGNOSIS — Z00.00 ROUTINE GENERAL MEDICAL EXAMINATION AT A HEALTH CARE FACILITY: ICD-10-CM

## 2021-03-18 DIAGNOSIS — E11.9 TYPE 2 DIABETES MELLITUS WITHOUT COMPLICATION, WITHOUT LONG-TERM CURRENT USE OF INSULIN (HCC): ICD-10-CM

## 2021-03-18 DIAGNOSIS — Z23 HIGH PRIORITY FOR COVID-19 VIRUS VACCINATION: ICD-10-CM

## 2021-03-18 DIAGNOSIS — M15.9 GENERALIZED OA: ICD-10-CM

## 2021-03-18 DIAGNOSIS — D50.8 OTHER IRON DEFICIENCY ANEMIA: ICD-10-CM

## 2021-03-18 DIAGNOSIS — E78.2 MIXED HYPERLIPIDEMIA: ICD-10-CM

## 2021-03-18 DIAGNOSIS — K21.9 GASTROESOPHAGEAL REFLUX DISEASE WITHOUT ESOPHAGITIS: ICD-10-CM

## 2021-03-18 DIAGNOSIS — I10 ESSENTIAL HYPERTENSION: ICD-10-CM

## 2021-03-18 DIAGNOSIS — H91.93 HEARING REDUCED, BILATERAL: ICD-10-CM

## 2021-03-18 LAB — HBA1C MFR BLD: 5.8 %

## 2021-03-18 PROCEDURE — G8484 FLU IMMUNIZE NO ADMIN: HCPCS | Performed by: INTERNAL MEDICINE

## 2021-03-18 PROCEDURE — 1123F ACP DISCUSS/DSCN MKR DOCD: CPT | Performed by: INTERNAL MEDICINE

## 2021-03-18 PROCEDURE — 4040F PNEUMOC VAC/ADMIN/RCVD: CPT | Performed by: INTERNAL MEDICINE

## 2021-03-18 PROCEDURE — G8427 DOCREV CUR MEDS BY ELIG CLIN: HCPCS | Performed by: INTERNAL MEDICINE

## 2021-03-18 PROCEDURE — 83036 HEMOGLOBIN GLYCOSYLATED A1C: CPT | Performed by: INTERNAL MEDICINE

## 2021-03-18 PROCEDURE — 1036F TOBACCO NON-USER: CPT | Performed by: INTERNAL MEDICINE

## 2021-03-18 PROCEDURE — G0438 PPPS, INITIAL VISIT: HCPCS | Performed by: INTERNAL MEDICINE

## 2021-03-18 PROCEDURE — G8417 CALC BMI ABV UP PARAM F/U: HCPCS | Performed by: INTERNAL MEDICINE

## 2021-03-18 PROCEDURE — 99213 OFFICE O/P EST LOW 20 MIN: CPT | Performed by: INTERNAL MEDICINE

## 2021-03-18 RX ORDER — NIFEDIPINE 30 MG/1
30 TABLET, FILM COATED, EXTENDED RELEASE ORAL DAILY
Qty: 90 TABLET | Refills: 5 | Status: SHIPPED | OUTPATIENT
Start: 2021-03-18 | End: 2021-11-08 | Stop reason: SDUPTHER

## 2021-03-18 RX ORDER — OMEPRAZOLE 40 MG/1
40 CAPSULE, DELAYED RELEASE ORAL DAILY
Qty: 90 CAPSULE | Refills: 1 | Status: SHIPPED | OUTPATIENT
Start: 2021-03-18 | End: 2021-11-09

## 2021-03-18 RX ORDER — PRAVASTATIN SODIUM 20 MG
20 TABLET ORAL DAILY
Qty: 90 TABLET | Refills: 5 | Status: SHIPPED | OUTPATIENT
Start: 2021-03-18 | End: 2021-11-08 | Stop reason: SDUPTHER

## 2021-03-18 RX ORDER — FERROUS SULFATE 220 (44)/5
220 ELIXIR ORAL DAILY
Qty: 1 BOTTLE | Refills: 5 | Status: SHIPPED | OUTPATIENT
Start: 2021-03-18

## 2021-03-18 RX ORDER — ACETAMINOPHEN 500 MG
500 TABLET ORAL EVERY 6 HOURS PRN
Qty: 120 TABLET | Refills: 1 | Status: SHIPPED | OUTPATIENT
Start: 2021-03-18

## 2021-03-18 ASSESSMENT — LIFESTYLE VARIABLES
HOW OFTEN DURING THE LAST YEAR HAVE YOU NEEDED AN ALCOHOLIC DRINK FIRST THING IN THE MORNING TO GET YOURSELF GOING AFTER A NIGHT OF HEAVY DRINKING: 0
AUDIT TOTAL SCORE: 1
HOW OFTEN DO YOU HAVE A DRINK CONTAINING ALCOHOL: 1
HOW MANY STANDARD DRINKS CONTAINING ALCOHOL DO YOU HAVE ON A TYPICAL DAY: 0
HOW OFTEN DURING THE LAST YEAR HAVE YOU HAD A FEELING OF GUILT OR REMORSE AFTER DRINKING: 0
HOW OFTEN DO YOU HAVE SIX OR MORE DRINKS ON ONE OCCASION: 0
HAS A RELATIVE, FRIEND, DOCTOR, OR ANOTHER HEALTH PROFESSIONAL EXPRESSED CONCERN ABOUT YOUR DRINKING OR SUGGESTED YOU CUT DOWN: 0

## 2021-03-18 ASSESSMENT — PATIENT HEALTH QUESTIONNAIRE - PHQ9
2. FEELING DOWN, DEPRESSED OR HOPELESS: 0
SUM OF ALL RESPONSES TO PHQ QUESTIONS 1-9: 0
SUM OF ALL RESPONSES TO PHQ QUESTIONS 1-9: 0
1. LITTLE INTEREST OR PLEASURE IN DOING THINGS: 0

## 2021-03-18 ASSESSMENT — ENCOUNTER SYMPTOMS
EYES NEGATIVE: 1
CONSTIPATION: 0
COUGH: 0
BLOOD IN STOOL: 0
WHEEZING: 0
SHORTNESS OF BREATH: 0
GASTROINTESTINAL NEGATIVE: 1
CHEST TIGHTNESS: 0
DIARRHEA: 0

## 2021-03-18 NOTE — TELEPHONE ENCOUNTER
Rep @ pharmacy ask can the prescription for ferrous sulfate 220/mg/5ml solution (bottle) clarity on how much to take back it comes in stock bottle, is it 30 day supply.

## 2021-03-18 NOTE — PROGRESS NOTES
Subjective:      Patient ID: Adan Sever is a 80 y.o. male. 3/18/2021 Patient presents with:  Medicare AWV  Diabetes ; HTN       Here with Daughter who will be his Power of  and manage every thing including meds           Last exam 9/29/2020 Patient presents with:  Check-Up    No falls   No depression     Shoulder hurts , rt > left           Last seen  10/3/19       Diabetes  Pertinent negatives for hypoglycemia include no dizziness, headaches, nervousness/anxiousness or tremors. Pertinent negatives for diabetes include no chest pain, no fatigue and no weakness. Review of Systems   Constitutional: Negative for chills, fatigue and fever. Pneumovac 2007. PREVNAR 2/17    Tdap 9/17    Flu vac  9/20    Shingrex  9/18; Covid vac    HENT: Positive for hearing loss. Dentures . Eyes: Negative. Eye Ex  4/20       Respiratory: Negative for cough, chest tightness, shortness of breath and wheezing. Does not smoke    Cardiovascular: Negative. Negative for chest pain. HTN. No CAD   Gastrointestinal: Negative. Negative for blood in stool, constipation and diarrhea. Repeat Colonoscopy 6/19 . Dr Spring Carvajal . Polyps   Endocrine:        Diabetes > 10 yrs    Simple cysts in Thyroid US / 3/15   Genitourinary: Negative for dysuria and urgency. H/O Prostate Cancer ( 2004)  Saw Dr Toribio Faustin  . 2/17   Musculoskeletal: Positive for arthralgias (shoulders). Sees Podiatrist regularly  q 6 mths    Skin: Negative for rash. Allergic/Immunologic: Negative for food allergies. Neurological: Negative for dizziness, tremors, weakness, light-headedness and headaches. Psychiatric/Behavioral: Negative for behavioral problems and sleep disturbance. The patient is not nervous/anxious. Objective:   Physical Exam   Constitutional: He is oriented to person, place, and time. No distress. Picked up 9 lbs!    HENT:   Bilat ear wax ; rt > left   Cardiovascular: Marylene Pier Date: 3/18/2021   MRN: <[id number]> Sex: Male   Age: 80 y.o. Ethnicity: Non-/Non    : 1932 Race: Ania Valdivia is here for Medicare AWV and Diabetes    Screenings for behavioral, psychosocial and functional/safety risks, and cognitive dysfunction are all negative except as indicated below. These results, as well as other patient data from the [address] form, are documented in Flowsheets linked to this Encounter. No Known Allergies    Prior to Visit Medications    Medication Sig Taking? Authorizing Provider   NIFEdipine (ADALAT CC) 30 MG extended release tablet Take 1 tablet by mouth daily Yes Wes Kamara MD   pravastatin (PRAVACHOL) 20 MG tablet Take 1 tablet by mouth daily Yes Wes Kamara MD   omeprazole (PRILOSEC) 40 MG delayed release capsule Take 1 capsule by mouth daily Yes Wes Kamara MD   ferrous sulfate 220 (44 Fe) MG/5ML solution Take 5 mLs by mouth daily Yes Wes Kamara MD   acetaminophen (TYLENOL) 500 MG tablet Take 1 tablet by mouth every 6 hours as needed for Pain Yes Wes Kamara MD       Past Medical History:   Diagnosis Date    Hyperlipidemia     Hypertension     Type II or unspecified type diabetes mellitus without mention of complication, not stated as uncontrolled        Past Surgical History:   Procedure Laterality Date    COLONOSCOPY      COLONOSCOPY N/A 6/10/2019    COLONOSCOPY POLYPECTOMY SNARE/COLD BIOPSY performed by Garrett Card MD at [address] ARTHROSCOPY         No family history on file.     CareTeam (Including outside providers/suppliers regularly involved in providing care):   Patient Care Team:  Wes Kamara MD as PCP - General (Internal Medicine)  Wes Kamara MD as PCP - [facility]    Wt Readings from Last 3 Encounters:   21 205 lb (93 kg)   20 209 lb (94.8 kg)   10/03/19 200 lb (90.7 kg)     Vitals:    21 1114 21 1228 BP: (!) 142/71 (!) 140/80   Pulse: 65    Temp: 96.8 °F (36 °C)    TempSrc: Temporal    SpO2: 99%    Weight: 205 lb (93 kg)    Height: 6' 1\" (1.854 m)      Body mass index is 27.05 kg/m². Based upon direct observation of the patient, evaluation of cognition reveals recent and remote memory intact. Patient's complete Health Risk Assessment and screening values have been reviewed and are found in Flowsheets. The following problems were reviewed today and where indicated follow up appointments were made and/or referrals ordered. Positive Risk Factor Screenings with Interventions:          General Health and ACP:  General  In general, how would you say your health is?: Good  In the past 7 days, have you experienced any of the following?  New or Increased Pain, New or Increased Fatigue, Loneliness, Social Isolation, Stress or Anger?: None of These  Do you get the social and emotional support that you need?: Yes  Do you have a Living Will?: (!) No  Advance Directives     Power of [address] [place] [address] Will ACP-Advance Directive ACP-Power of     Not on File Not on File Not on File Not on File      General Health Risk Interventions:  · No Living Will: Advance Care Planning addressed with patient today    Health Habits/Nutrition:  Health Habits/Nutrition  Do you exercise for at least 20 minutes 2-3 times per week?: Yes  Have you lost any weight without trying in the past 3 months?: No  Do you eat only one meal per day?: (!) Yes  Have you seen the dentist within the past year?: (!) No  Body mass index: (!) 27.04  Health Habits/Nutrition Interventions:  · Nutritional issues:  educational materials for healthy, well-balanced diet provided  · Dental exam overdue:  patient encouraged to make appointment with his/her dentist    Hearing/Vision:  No exam data present  Hearing/Vision  Do you or your family notice any trouble with your hearing that hasn't been managed with hearing aids?: (!) Yes  Do you have difficulty driving, watching TV, or doing any of your daily activities because of your eyesight?: No  Have you had an eye exam within the past year?: Appointment is scheduled  Hearing/Vision Interventions:  · Hearing concerns:  audiology referral provided      Personalized Preventive Plan   Current Health Maintenance Status  Immunization History   Administered Date(s) Administered    Influenza Vaccine, unspecified formulation 11/12/2013, 09/19/2014, 09/24/2015, 10/05/2016    Influenza Virus Vaccine 11/20/2008, 12/10/2009, 09/28/2010    Influenza Whole 12/09/2003, 10/28/2004, 09/13/2005    Influenza, High Dose (Fluzone 65 yrs and older) 10/31/2011, 10/11/2012, 11/12/2013, 09/19/2014, 09/24/2015, 10/05/2016, 09/28/2017, 09/07/2018    Influenza, Quadv, adjuvanted, 65 yrs +, IM, PF (Fluad) 09/29/2020    Influenza, Triv, inactivated, subunit, adjuvanted, IM (Fluad 65 yrs and older) 10/03/2019    Pneumococcal Conjugate 13-valent ([id number]) 02/28/2017    Pneumococcal Polysaccharide ([id number]) 09/13/2005    Tdap (Boostrix, Adacel) 09/28/2017        Health Maintenance   Topic Date Due    COVID-19 Vaccine (1) Never done    Shingles Vaccine (1 of 2) Never done    PSA counseling  02/28/2018    Annual Wellness Visit (AWV)  Never done    Lipid screen  09/30/2021    DTaP/Tdap/Td vaccine (2 - Td) 09/28/2027    Flu vaccine  Completed    Pneumococcal 65+ years Vaccine  Completed    Hepatitis A vaccine  Aged Out    Hib vaccine  Aged Out    Meningococcal (ACWY) vaccine  Aged Out     Recommendations for [employer] Due: see orders and patient instructions/AVS.  .   Recommended screening schedule for the next 5-10 years is provided to the patient in written form: see Patient Instructions/AVS.

## 2021-03-25 ENCOUNTER — NURSE ONLY (OUTPATIENT)
Dept: PRIMARY CARE CLINIC | Age: 86
End: 2021-03-25
Payer: MEDICARE

## 2021-03-25 DIAGNOSIS — Z20.822 SUSPECTED COVID-19 VIRUS INFECTION: Primary | ICD-10-CM

## 2021-03-25 LAB — SARS-COV-2: NOT DETECTED

## 2021-03-25 PROCEDURE — 99211 OFF/OP EST MAY X REQ PHY/QHP: CPT | Performed by: NURSE PRACTITIONER

## 2021-03-25 NOTE — PROGRESS NOTES
Malathi Dani received a viral test for COVID-19. They were educated on isolation and quarantine as appropriate. For any symptoms, they were directed to seek care from their PCP, given contact information to establish with a doctor, directed to an urgent care or the emergency room.

## 2021-03-30 ENCOUNTER — IMMUNIZATION (OUTPATIENT)
Dept: FAMILY MEDICINE CLINIC | Age: 86
End: 2021-03-30
Payer: MEDICARE

## 2021-03-30 PROCEDURE — 0001A COVID-19, PFIZER VACCINE 30MCG/0.3ML DOSE: CPT | Performed by: FAMILY MEDICINE

## 2021-03-30 PROCEDURE — 91300 COVID-19, PFIZER VACCINE 30MCG/0.3ML DOSE: CPT | Performed by: FAMILY MEDICINE

## 2021-04-15 ENCOUNTER — TELEPHONE (OUTPATIENT)
Dept: FAMILY MEDICINE CLINIC | Age: 86
End: 2021-04-15

## 2021-04-16 ENCOUNTER — PROCEDURE VISIT (OUTPATIENT)
Dept: AUDIOLOGY | Age: 86
End: 2021-04-16
Payer: MEDICARE

## 2021-04-16 ENCOUNTER — OFFICE VISIT (OUTPATIENT)
Dept: ENT CLINIC | Age: 86
End: 2021-04-16
Payer: MEDICARE

## 2021-04-16 ENCOUNTER — TELEPHONE (OUTPATIENT)
Dept: OTOLARYNGOLOGY | Age: 86
End: 2021-04-16

## 2021-04-16 DIAGNOSIS — H61.23 BILATERAL IMPACTED CERUMEN: Primary | ICD-10-CM

## 2021-04-16 DIAGNOSIS — H90.3 SENSORINEURAL HEARING LOSS (SNHL) OF BOTH EARS: Primary | ICD-10-CM

## 2021-04-16 DIAGNOSIS — H90.3 SENSORINEURAL HEARING LOSS (SNHL) OF BOTH EARS: ICD-10-CM

## 2021-04-16 PROCEDURE — 1123F ACP DISCUSS/DSCN MKR DOCD: CPT | Performed by: OTOLARYNGOLOGY

## 2021-04-16 PROCEDURE — 69210 REMOVE IMPACTED EAR WAX UNI: CPT | Performed by: OTOLARYNGOLOGY

## 2021-04-16 PROCEDURE — G8427 DOCREV CUR MEDS BY ELIG CLIN: HCPCS | Performed by: OTOLARYNGOLOGY

## 2021-04-16 PROCEDURE — G8417 CALC BMI ABV UP PARAM F/U: HCPCS | Performed by: OTOLARYNGOLOGY

## 2021-04-16 PROCEDURE — 4040F PNEUMOC VAC/ADMIN/RCVD: CPT | Performed by: OTOLARYNGOLOGY

## 2021-04-16 PROCEDURE — 1036F TOBACCO NON-USER: CPT | Performed by: OTOLARYNGOLOGY

## 2021-04-16 PROCEDURE — 99212 OFFICE O/P EST SF 10 MIN: CPT | Performed by: OTOLARYNGOLOGY

## 2021-04-16 PROCEDURE — 92567 TYMPANOMETRY: CPT | Performed by: AUDIOLOGIST

## 2021-04-16 PROCEDURE — 92557 COMPREHENSIVE HEARING TEST: CPT | Performed by: AUDIOLOGIST

## 2021-04-16 NOTE — PROGRESS NOTES
[facility]  Division of Audiology/Otolaryngology    4/16/2021     PCP: Evangelina Thurston MD   MRN: <[id number]>     AUDIOLOGIC AND OTHER PERTINENT MEDICAL HISTORY:        Mr. Rayne Mancini was seen for hearing and middle ear evaluation. PCP is referral source of today's appointment. Patient presents with cerumen impacted canals in both ears. Sent to Dr Irvin Gerber for removal.  He notes there are no hearing concerns. His family feels, otherwise. AUDIOGRAM/IMMITTANCE (MIDDLE EAR FUNCTION):        Audiogram demonstrates hearing ranging from normal- sensory loss of both ears. Loss is symmetrical. Speech discrimination ability was poor, in quiet, at elevated levels- more severe on right side. Immittance consistent with normal middle ear function (Type A curve) bilaterally. Ipsilateral acoustic reflexes present at some frequencies. COUNSELING:          Audiogram results were reviewed with patient. RECOMMENDATIONS:       Hearing status does warrant amplification. Patient is not interested at this time.     Electronically signed by Ray Piña on 4/16/21 at 12:30 PM.

## 2021-04-16 NOTE — TELEPHONE ENCOUNTER
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 It is suggested that he obtain hearing aids.

## 2021-04-16 NOTE — PROGRESS NOTES
Patient has been told by his family members that he is having a problem hearing although he denies it. He has had some degree of tinnitus but not severe. He uses a cane but does not notice any vertiginous episodes. He has had no fever. Concern is raised as to cerumen being present as noted by his PCP. Currently, he appears in no obvious acute distress but does seem to have difficulty hearing. Ear examination does reveal extensive cerumen impactions on both sides. These are removed with a combination of irrigation, suctioning, curettage, and cleaning with peroxide. Upon removal, both tympanic membranes appear normal and the ear canals are now clear. There is no nystagmus present. Ear examination is unremarkable. The nasal mucosa is mildly edematous consistent with some allergy but there is no evidence of infection or obstruction. The neck remains free of any adenopathy, mass, thyroid enlargement.   He will now return for his scheduled audiogram.

## 2021-04-20 ENCOUNTER — IMMUNIZATION (OUTPATIENT)
Dept: FAMILY MEDICINE CLINIC | Age: 86
End: 2021-04-20
Payer: MEDICARE

## 2021-04-20 PROCEDURE — 91300 COVID-19, PFIZER VACCINE 30MCG/0.3ML DOSE: CPT | Performed by: FAMILY MEDICINE

## 2021-04-20 PROCEDURE — 0002A COVID-19, PFIZER VACCINE 30MCG/0.3ML DOSE: CPT | Performed by: FAMILY MEDICINE

## 2021-10-19 ENCOUNTER — OFFICE VISIT (OUTPATIENT)
Dept: INTERNAL MEDICINE CLINIC | Age: 86
End: 2021-10-19
Payer: MEDICARE

## 2021-10-19 ENCOUNTER — NURSE TRIAGE (OUTPATIENT)
Dept: OTHER | Facility: CLINIC | Age: 86
End: 2021-10-19

## 2021-10-19 VITALS
DIASTOLIC BLOOD PRESSURE: 80 MMHG | TEMPERATURE: 96.9 F | SYSTOLIC BLOOD PRESSURE: 130 MMHG | HEART RATE: 82 BPM | BODY MASS INDEX: 24.78 KG/M2 | WEIGHT: 187 LBS | OXYGEN SATURATION: 97 % | HEIGHT: 73 IN

## 2021-10-19 DIAGNOSIS — M17.12 PRIMARY OSTEOARTHRITIS OF LEFT KNEE: Primary | ICD-10-CM

## 2021-10-19 DIAGNOSIS — M25.551 RIGHT HIP PAIN: ICD-10-CM

## 2021-10-19 DIAGNOSIS — R26.81 GAIT INSTABILITY: ICD-10-CM

## 2021-10-19 PROCEDURE — 1036F TOBACCO NON-USER: CPT | Performed by: NURSE PRACTITIONER

## 2021-10-19 PROCEDURE — G8427 DOCREV CUR MEDS BY ELIG CLIN: HCPCS | Performed by: NURSE PRACTITIONER

## 2021-10-19 PROCEDURE — G8420 CALC BMI NORM PARAMETERS: HCPCS | Performed by: NURSE PRACTITIONER

## 2021-10-19 PROCEDURE — 1123F ACP DISCUSS/DSCN MKR DOCD: CPT | Performed by: NURSE PRACTITIONER

## 2021-10-19 PROCEDURE — 4040F PNEUMOC VAC/ADMIN/RCVD: CPT | Performed by: NURSE PRACTITIONER

## 2021-10-19 PROCEDURE — 99214 OFFICE O/P EST MOD 30 MIN: CPT | Performed by: NURSE PRACTITIONER

## 2021-10-19 PROCEDURE — G8484 FLU IMMUNIZE NO ADMIN: HCPCS | Performed by: NURSE PRACTITIONER

## 2021-10-19 SDOH — ECONOMIC STABILITY: FOOD INSECURITY: WITHIN THE PAST 12 MONTHS, YOU WORRIED THAT YOUR FOOD WOULD RUN OUT BEFORE YOU GOT MONEY TO BUY MORE.: NEVER TRUE

## 2021-10-19 SDOH — ECONOMIC STABILITY: FOOD INSECURITY: WITHIN THE PAST 12 MONTHS, THE FOOD YOU BOUGHT JUST DIDN'T LAST AND YOU DIDN'T HAVE MONEY TO GET MORE.: NEVER TRUE

## 2021-10-19 ASSESSMENT — ENCOUNTER SYMPTOMS
DIARRHEA: 0
SINUS PAIN: 0
BACK PAIN: 0
ABDOMINAL PAIN: 0
SINUS PRESSURE: 0
CONSTIPATION: 0
COUGH: 0
SHORTNESS OF BREATH: 0
WHEEZING: 0
COLOR CHANGE: 0

## 2021-10-19 ASSESSMENT — PATIENT HEALTH QUESTIONNAIRE - PHQ9
2. FEELING DOWN, DEPRESSED OR HOPELESS: 0
DEPRESSION UNABLE TO ASSESS: FUNCTIONAL CAPACITY MOTIVATION LIMITS ACCURACY
SUM OF ALL RESPONSES TO PHQ9 QUESTIONS 1 & 2: 0
SUM OF ALL RESPONSES TO PHQ QUESTIONS 1-9: 0
1. LITTLE INTEREST OR PLEASURE IN DOING THINGS: 0

## 2021-10-19 ASSESSMENT — SOCIAL DETERMINANTS OF HEALTH (SDOH): HOW HARD IS IT FOR YOU TO PAY FOR THE VERY BASICS LIKE FOOD, HOUSING, MEDICAL CARE, AND HEATING?: NOT HARD AT ALL

## 2021-10-19 NOTE — PROGRESS NOTES
Teresa Borrego (:  1932) is a 80 y.o. male,Established patient, here for evaluation of the following chief complaint(s): Other (falling)      ASSESSMENT/PLAN:  1. Primary osteoarthritis of left knee  -     DME Order for Walker as OP  2. Gait instability  Assessment & Plan:  Encouraged use of walker versus cane  DME ordered for walker  We will arrange for home PT/OT evaluation  Courage follow-up with PCP in 1 month  3. Right hip pain  Assessment & Plan:  Pain is mild  No loss of range of motion or strength  No injury to head during fall  Low suspicion for fracture  PT/OT ordered       No follow-ups on file. SUBJECTIVE/OBJECTIVE:  HPI  Patient is here for concern of frequent falls over the past 2 weeks. Reports three or four falls. Denies striking head or loss of consciousness. Denies dizziness, lightheadedness. States his legs are \"just giving out on him. \"  He does report some mild right hip pain. States is not too bothersome. Denies falling onto this hip. No bruising. Has full range of motion. Not taking any medications for pain. He is ambulating with a cane for balance. He does occasionally feel off balance. Notes has ambulation has been slower lately. Current Outpatient Medications   Medication Sig Dispense Refill    NIFEdipine (ADALAT CC) 30 MG extended release tablet Take 1 tablet by mouth daily 90 tablet 5    pravastatin (PRAVACHOL) 20 MG tablet Take 1 tablet by mouth daily 90 tablet 5    omeprazole (PRILOSEC) 40 MG delayed release capsule Take 1 capsule by mouth daily 90 capsule 1    ferrous sulfate 220 (44 Fe) MG/5ML solution Take 5 mLs by mouth daily 1 Bottle 5    acetaminophen (TYLENOL) 500 MG tablet Take 1 tablet by mouth every 6 hours as needed for Pain 120 tablet 1     No current facility-administered medications for this visit. Review of Systems   Constitutional: Negative for chills, fatigue and fever. HENT: Negative for congestion, sinus pressure and sinus pain. Respiratory: Negative for cough, shortness of breath and wheezing. Cardiovascular: Negative for chest pain and palpitations. Gastrointestinal: Negative for abdominal pain, constipation and diarrhea. Musculoskeletal: Positive for arthralgias (right hip) and gait problem. Negative for back pain and myalgias. Skin: Negative for color change, pallor and rash. Neurological: Negative for dizziness, syncope, weakness, light-headedness and headaches. Psychiatric/Behavioral: Negative for behavioral problems, confusion and sleep disturbance. The patient is not nervous/anxious. Vitals:    10/19/21 1504   BP: 130/80   Site: Right Upper Arm   Position: Sitting   Cuff Size: Medium Adult   Pulse: 82   Temp: 96.9 °F (36.1 °C)   SpO2: 97%   Weight: 187 lb (84.8 kg)   Height: 6' 1\" (1.854 m)       Physical Exam  Constitutional:       Appearance: He is well-developed. HENT:      Head: Normocephalic and atraumatic. Eyes:      Conjunctiva/sclera: Conjunctivae normal.      Pupils: Pupils are equal, round, and reactive to light. Neck:      Thyroid: No thyromegaly. Vascular: No JVD. Cardiovascular:      Rate and Rhythm: Normal rate and regular rhythm. Heart sounds: Normal heart sounds. Pulmonary:      Effort: Pulmonary effort is normal. No respiratory distress. Breath sounds: Normal breath sounds. No wheezing. Musculoskeletal:         General: No deformity. Normal range of motion. Cervical back: Normal range of motion and neck supple. Right hip: No tenderness or bony tenderness. Normal range of motion. Normal strength. Skin:     General: Skin is warm and dry. Capillary Refill: Capillary refill takes less than 2 seconds. Neurological:      Mental Status: He is alert and oriented to person, place, and time. Motor: No weakness.       Coordination: Coordination normal.   Psychiatric:         Behavior: Behavior normal.             An electronic signature was used to authenticate this note.     --Greg Shaffer, APRN - CNP

## 2021-10-19 NOTE — ASSESSMENT & PLAN NOTE
Pain is mild  No loss of range of motion or strength  No injury to head during fall  Low suspicion for fracture  PT/OT ordered

## 2021-10-19 NOTE — TELEPHONE ENCOUNTER
Received call from [facility] at [facility] with Red Flag Complaint. Brief description of triage: Falling, see below    Triage indicates for patient to Be seen today. However patient told to proceed to [facility] if no appointments available today. Care advice provided, patient verbalizes understanding; denies any other questions or concerns; instructed to call back for any new or worsening symptoms. Writer provided warm transfer to Kobe Hector at [facility] for appointment scheduling. Attention Provider: Thank you for allowing me to participate in the care of your patient. The patient was connected to triage in response to information provided to the [facility]/PSC. Please do not respond through this encounter as the response is not directed to a shared pool. Reason for Disposition   Patient wants to be seen    Answer Assessment - Initial Assessment Questions  1. DESCRIPTION: \"Describe your dizziness. \"  Loosing balance, falling        2. LIGHTHEADED: \"Do you feel lightheaded? \" (e.g., somewhat faint, woozy, weak upon standing)  Denies        3. VERTIGO: \"Do you feel like either you or the room is spinning or tilting? \" (i.e. vertigo)  No, just stands and then will fall, then gets up and goes back to walking        4. SEVERITY: \"How bad is it? \"  \"Do you feel like you are going to faint? \" \"Can you stand and walk? \"    - MILD - walking normally    - MODERATE - interferes with normal activities (e.g., work, school)     - SEVERE - unable to stand, requires support to walk, feels like passing out now. Moderate, denies hit head, hit his right hip, no pain anywhere    5. ONSET:  \"When did the dizziness begin? \"      Yesterday afternoon    6. AGGRAVATING FACTORS: \"Does anything make it worse? \" (e.g., standing, change in head position)  Yes, standing too fast. Pt states when he falls, he gets back up, gets his cane, and \"gets back going\"       7. HEART RATE: \"Can you tell me your heart rate? \" \"How many beats in 15 seconds? \"  (Note: not all patients can do this)        Normal per patient    8. CAUSE: \"What do you think is causing the dizziness? \"  \"I don't think so\"        9. RECURRENT SYMPTOM: \"Have you had dizziness before? \" If so, ask: \"When was the last time? \" \"What happened that time? \"  Yes, happening more frequently now, 2-3 times in the last week        10. OTHER SYMPTOMS: \"Do you have any other symptoms? \" (e.g., fever, chest pain, vomiting, diarrhea, bleeding)  Denies          11. PREGNANCY: \"Is there any chance you are pregnant? \" \"When was your last menstrual period? \"        N/A    Protocols used: [id number]

## 2021-10-19 NOTE — ASSESSMENT & PLAN NOTE
Encouraged use of walker versus cane  DME ordered for walker  We will arrange for home PT/OT evaluation  Courage follow-up with PCP in 1 month

## 2021-10-20 ENCOUNTER — TELEPHONE (OUTPATIENT)
Dept: PRIMARY CARE CLINIC | Age: 86
End: 2021-10-20

## 2021-10-20 NOTE — TELEPHONE ENCOUNTER
----- Message from [address] sent at 10/19/2021 12:07 PM EDT -----  Subject: Message to Provider    QUESTIONS  Information for Provider? lost call with patient. Said there was hip pain   from a fall. NT was suggested in the call flow. ---------------------------------------------------------------------------  --------------  Sequence INFO  What is the best way for the office to contact you? OK to leave message on   voicemail  Preferred Call Back Phone Number? [phone]  ---------------------------------------------------------------------------  --------------  SCRIPT ANSWERS  Relationship to Patient?  Self

## 2021-11-05 DIAGNOSIS — D50.8 OTHER IRON DEFICIENCY ANEMIA: ICD-10-CM

## 2021-11-05 DIAGNOSIS — K21.9 GASTROESOPHAGEAL REFLUX DISEASE WITHOUT ESOPHAGITIS: ICD-10-CM

## 2021-11-08 ENCOUNTER — TELEPHONE (OUTPATIENT)
Dept: PRIMARY CARE CLINIC | Age: 86
End: 2021-11-08

## 2021-11-08 DIAGNOSIS — I10 ESSENTIAL HYPERTENSION: ICD-10-CM

## 2021-11-08 DIAGNOSIS — E11.9 TYPE 2 DIABETES MELLITUS WITHOUT COMPLICATION, WITHOUT LONG-TERM CURRENT USE OF INSULIN (HCC): ICD-10-CM

## 2021-11-08 DIAGNOSIS — E78.2 MIXED HYPERLIPIDEMIA: ICD-10-CM

## 2021-11-08 RX ORDER — PRAVASTATIN SODIUM 20 MG
20 TABLET ORAL DAILY
Qty: 90 TABLET | Refills: 5 | Status: CANCELLED | OUTPATIENT
Start: 2021-11-08

## 2021-11-08 RX ORDER — PRAVASTATIN SODIUM 20 MG
20 TABLET ORAL DAILY
Qty: 90 TABLET | Refills: 5 | Status: SHIPPED | OUTPATIENT
Start: 2021-11-08

## 2021-11-08 RX ORDER — NIFEDIPINE 30 MG/1
30 TABLET, FILM COATED, EXTENDED RELEASE ORAL DAILY
Qty: 90 TABLET | Refills: 5 | Status: SHIPPED | OUTPATIENT
Start: 2021-11-08

## 2021-11-08 RX ORDER — NIFEDIPINE 30 MG/1
30 TABLET, FILM COATED, EXTENDED RELEASE ORAL DAILY
Qty: 90 TABLET | Refills: 5 | Status: CANCELLED | OUTPATIENT
Start: 2021-11-08

## 2021-11-08 NOTE — TELEPHONE ENCOUNTER
Medication:   Requested Prescriptions     Pending Prescriptions Disp Refills    omeprazole (PRILOSEC) 40 MG delayed release capsule [Pharmacy Med Name: OMEPRAZOLE DR 40 MG CAPSULE] 90 capsule 1     Sig: TAKE ONE CAPSULE BY MOUTH DAILY        Last Filled:      Patient Phone Number: [phone] (home)     Last appt: 3/18/2021   Next appt: 11/8/2021    Last OARRS: No flowsheet data found.

## 2021-11-08 NOTE — TELEPHONE ENCOUNTER
Medication:   Requested Prescriptions     Pending Prescriptions Disp Refills    pravastatin (PRAVACHOL) 20 MG tablet 90 tablet 5     Sig: Take 1 tablet by mouth daily    NIFEdipine (ADALAT CC) 30 MG extended release tablet 90 tablet 5     Sig: Take 1 tablet by mouth daily        Last Filled:      Patient Phone Number: [phone] (home)     Last appt: 3/18/2021   Next appt: Visit date not found    Last OARRS: No flowsheet data found.

## 2021-11-08 NOTE — TELEPHONE ENCOUNTER
Patient needs a refill in his Omeprazole, Pravastatin, and Nifedipine sent to [facility] on [address] [facility]. Last OV 3/18/21.

## 2021-11-09 RX ORDER — OMEPRAZOLE 40 MG/1
CAPSULE, DELAYED RELEASE ORAL
Qty: 90 CAPSULE | Refills: 1 | Status: SHIPPED | OUTPATIENT
Start: 2021-11-09

## 2021-11-15 ENCOUNTER — CLINICAL DOCUMENTATION (OUTPATIENT)
Dept: OTHER | Age: 86
End: 2021-11-15

## 2022-03-03 ENCOUNTER — TELEPHONE (OUTPATIENT)
Dept: ORTHOPEDIC SURGERY | Age: 87
End: 2022-03-03

## 2022-03-03 NOTE — TELEPHONE ENCOUNTER
Dear PCP Provider: [facility]) has partnered with [facility] to utilize predictive analytics to improve the care management for patients with arthritic knee pain. Utilizing claims data and patient visit features, we have developed an algorithmic risk score to determine which patient's quality of life may be most impacted to their knee pain. The selection criteria for this  program are: 1) risk score greater than .85; 2) existing [address] [place] [address] Floor patient; and 3) seen for knee pain in the past 3 years. Based upon the predictive analytics risk score  program, your patient has a risk score of greater than . 85 (i.e. 85% probability in having their quality of life impacted by their knee pain). To assist with care management of your patient, a navigator will be contacting them to understand their knee pain status and to educate on the [address] Pain Program, including scheduling an appointment with a joint specialist or their PCP. If you feel this patient not appropriate to be contacted given other medical reasons, please reply back to the navigator within 7 days with reason why not to be contacted. Otherwise, the navigator will follow up with you on the details of the patient encounter after the call.  Thank you for your support for this program.

## 2022-04-11 ENCOUNTER — TELEPHONE (OUTPATIENT)
Dept: ORTHOPEDIC SURGERY | Age: 87
End: 2022-04-11

## 2022-04-11 NOTE — TELEPHONE ENCOUNTER
Attempted to contact patient to inform them about the [address]  joint pain program.    To provide optimal care, [address] , in collaboration with our Primary Care Providers, are excited to update our [facility] patients on our joint pain program.    In the past have you received injections in your knees? Or are you currently experiencing knee pain that is impacting your daily activities or quality of life? For more information about what [address]  can offer to you or to set up an appointment with a joint pain specialist, please call us back at [phone].

## 2024-03-28 ENCOUNTER — TELEPHONE (OUTPATIENT)
Dept: PRIMARY CARE CLINIC | Age: 89
End: 2024-03-28

## 2024-03-28 NOTE — TELEPHONE ENCOUNTER
[place] (POA) [facility] now. She will ask him if wants to continue with Dr Rosales as a back up. I told her he needs to make an appointment last seen in 2021 if he is Ok w that.  Please schedule an appointment.

## (undated) DEVICE — SNARE ENDOSCP L240CM LOOP W13MM DIA2.4MM SHT THROW SM OVL